# Patient Record
Sex: FEMALE | Race: WHITE | URBAN - METROPOLITAN AREA
[De-identification: names, ages, dates, MRNs, and addresses within clinical notes are randomized per-mention and may not be internally consistent; named-entity substitution may affect disease eponyms.]

---

## 2017-06-06 ENCOUNTER — TRANSFERRED RECORDS (OUTPATIENT)
Dept: HEALTH INFORMATION MANAGEMENT | Facility: CLINIC | Age: 57
End: 2017-06-06

## 2017-07-17 ENCOUNTER — TRANSFERRED RECORDS (OUTPATIENT)
Dept: HEALTH INFORMATION MANAGEMENT | Facility: CLINIC | Age: 57
End: 2017-07-17

## 2017-11-14 ENCOUNTER — TRANSFERRED RECORDS (OUTPATIENT)
Dept: HEALTH INFORMATION MANAGEMENT | Facility: CLINIC | Age: 57
End: 2017-11-14

## 2018-05-02 ENCOUNTER — TRANSFERRED RECORDS (OUTPATIENT)
Dept: HEALTH INFORMATION MANAGEMENT | Facility: CLINIC | Age: 58
End: 2018-05-02

## 2019-03-20 ENCOUNTER — TELEPHONE (OUTPATIENT)
Dept: OTOLARYNGOLOGY | Facility: CLINIC | Age: 59
End: 2019-03-20

## 2019-03-20 ENCOUNTER — DOCUMENTATION ONLY (OUTPATIENT)
Dept: CARE COORDINATION | Facility: CLINIC | Age: 59
End: 2019-03-20

## 2019-03-20 NOTE — TELEPHONE ENCOUNTER
Right Fax referral - Lisa Mondragon PA-C referring to Dr. Mujica for Dysphagia. Per notes, Pt will call to schedule.

## 2019-03-22 ENCOUNTER — HEALTH MAINTENANCE LETTER (OUTPATIENT)
Age: 59
End: 2019-03-22

## 2019-03-25 NOTE — TELEPHONE ENCOUNTER
FUTURE VISIT INFORMATION      FUTURE VISIT INFORMATION:    Date: 4/12/19    Time: 9:00 am    Location: [facility] ENT  REFERRAL INFORMATION:    Referring provider:  HI Person    Referring providers clinic:  [facility]    Reason for visit/diagnosis  Dysphagia    RECORDS REQUESTED FROM:       Clinic name Comments Records Status Imaging Status   [facility] Office Visit/Referral 3/6/19, 6/7/18 HI Person Epic    [facility] Speech Office Visit 5/2/18  Care Everywhere    [facility] Xray XR Video Swallow-5/2/18 Care Everywhere PACS   [facility] Imaging XR Swallow Study-6/6/17,   XR Esophagus-8/23/16 Care Everywhere PACS                 3/25/19-Sent request for imaging to [facility] at [phone] and [facility] at [phone].  PB  3/26/19-Received images from [facility].  Still awaiting [facility].  PB  3/29/19-Received Fax from [facility] but no images.  Called and had them pushed.  PB  3/29/19-Images pushed and archived from [facility].  PB

## 2019-04-12 ENCOUNTER — PRE VISIT (OUTPATIENT)
Dept: OTOLARYNGOLOGY | Facility: CLINIC | Age: 59
End: 2019-04-12

## 2019-04-12 ENCOUNTER — OFFICE VISIT (OUTPATIENT)
Dept: OTOLARYNGOLOGY | Facility: CLINIC | Age: 59
End: 2019-04-12
Payer: COMMERCIAL

## 2019-04-12 VITALS — BODY MASS INDEX: 30.32 KG/M2 | WEIGHT: 182 LBS | HEIGHT: 65 IN

## 2019-04-12 DIAGNOSIS — R13.10 DYSPHAGIA, UNSPECIFIED TYPE: Primary | ICD-10-CM

## 2019-04-12 DIAGNOSIS — R13.10 DYSPHAGIA, UNSPECIFIED TYPE: ICD-10-CM

## 2019-04-12 LAB
BUN SERPL-MCNC: 12 MG/DL (ref 7–30)
CREAT SERPL-MCNC: 0.99 MG/DL (ref 0.52–1.04)
GFR SERPL CREATININE-BSD FRML MDRD: 63 ML/MIN/{1.73_M2}

## 2019-04-12 RX ORDER — ALPRAZOLAM 0.5 MG
0.5 TABLET ORAL
COMMUNITY
Start: 2019-04-02

## 2019-04-12 RX ORDER — PREGABALIN 50 MG/1
50 CAPSULE ORAL
COMMUNITY
Start: 2018-09-18

## 2019-04-12 RX ORDER — ESTRADIOL 0.07 MG/D
1 FILM, EXTENDED RELEASE TRANSDERMAL
COMMUNITY
Start: 2018-04-05

## 2019-04-12 RX ORDER — ALBUTEROL SULFATE 90 UG/1
AEROSOL, METERED RESPIRATORY (INHALATION)
Refills: 3 | COMMUNITY
Start: 2019-04-10

## 2019-04-12 RX ORDER — NAPROXEN SODIUM 220 MG
220 TABLET ORAL
COMMUNITY

## 2019-04-12 RX ORDER — FLUTICASONE PROPIONATE 50 MCG
1 SPRAY, SUSPENSION (ML) NASAL
COMMUNITY
Start: 2018-05-21

## 2019-04-12 RX ORDER — OMEPRAZOLE 40 MG/1
1 CAPSULE, DELAYED RELEASE ORAL
COMMUNITY
Start: 2016-03-08

## 2019-04-12 ASSESSMENT — PAIN SCALES - GENERAL: PAINLEVEL: NO PAIN (0)

## 2019-04-12 ASSESSMENT — MIFFLIN-ST. JEOR: SCORE: 1404.55

## 2019-04-12 NOTE — NURSING NOTE
"Chief Complaint   Patient presents with     Consult     Dysphagia . Unable to take Bp . Machine malfunction     Height 1.64 m (5' 4.57\"), weight 82.6 kg (182 lb).    Eric Whitman LPN    "

## 2019-04-12 NOTE — PATIENT INSTRUCTIONS
1.  You were seen in the ENT Clinic today by Dr. Medina.  If you have any questions or concerns after your appointment, please call [phone]. Press option #1 for scheduling related needs. Press option #3 for Nurse advice.    2.  Please schedule an appointment for the following:   - MRI Scan - Head   - Labs - BUN/Creatinine   - Neurology - Rule Out Neurological Dysfunction Causing Difficulty Swallowing   - Speech Therapy - Video Swallow Study    3.  Plan is to return to clinic to see Dr. Mujica or Dr. Barba for further evaluation of dysphagia.      Julissa Curiel LPN  [facility] Otolaryng[facility]  [phone]    The patient presents with a history of a globus sensation and mild dysphagia.  The patient will be referred for a speech/swallowing pathology evaluation and a consultation with Dr. Alfredo Mills or Dr. Tamara Mujica. The patient will also be referred to neurology with a MRI scan of the head prior to this visit to evaluate for neurological dysfunction that might be causing the difficulty initiating swallowing.

## 2019-04-12 NOTE — LETTER
4/12/2019       RE: Shalini Natarajan  [address] [place] [address]     Dear Colleague,    Thank you for referring your patient, Shalini Natarajan, to the [facility] EAR NOSE AND THROAT at [facility]. Please see a copy of my visit note below.    The patient presents with a history of a globus sensation and severe dysphagia.  She has been evaluated by [facility] and diagnosed with a Schotzki's Ring and gastroesophageal reflux. The patient denies odynophagia, hemoptysis, hematemasis, but she has experienced weight loss due to decreased food intake.  The patient reports that the symptoms have been progressively worsening over the past few months.  The patient denies sinusitis, rhinitis, facial pain, nasal obstruction or purulent nasal discharge. The patient denies chronic or recurrent tonsillitis, chronic or recurrent pharyngitis. The patient denies otalgia, otorrhea, eustachian tube dysfunction, ear infections, dizziness or tinnitus.     This patient is seen in consultation at the request of Dr. Lisa Mondragon.    All other systems were reviewed and they are either negative or they are not directly pertinent to this Otolaryngology examination.    Past Medical History:    No past medical history on file.    Past Surgical History:    No past surgical history on file.    Medications:      Current Outpatient Medications:      ALPRAZolam (XANAX) 0.5 MG tablet, Take 0.5 mg by mouth, Disp: , Rfl:      cetirizine HCl (ZYRTEC ALLERGY) 10 MG CAPS, , Disp: , Rfl:      estradiol (VIVELLE-DOT) 0.075 MG/24HR BIW patch, Place 1 patch onto the skin, Disp: , Rfl:      fluticasone (FLONASE) 50 MCG/ACT nasal spray, 1 spray, Disp: , Rfl:      melatonin 5 MG CAPS, Take 5 mg by mouth, Disp: , Rfl:      naproxen sodium (ALEVE) 220 MG tablet, Take 220 mg by mouth, Disp: , Rfl:      omeprazole (PRILOSEC) 40 MG DR capsule, Take 1 capsule by mouth, Disp: , Rfl:      pregabalin (LYRICA) 50 MG  capsule, Take 50 mg by mouth, Disp: , Rfl:      PROAIR  (90 Base) MCG/ACT inhaler, , Disp: , Rfl: 3    Allergies:    Dogs; Grass; Mold; and Trees    Physical Examination:    The patient is a well developed, well nourished female in no apparent distress.  She is normocephalic, atraumatic with pupils equally round and reactive to light.    Oral Cavity Examination:  Normal mucosa with no masses or lesions  Nasal Examination: Normal mucosa with no masses or lesions  Ear Examination: Ear canals clear, tympanic membranes and middle ear spaces normal  Neurological Examination: Facial nerve function intact and symmetric  Integumentary Examination: No lesions on the skin of the head and neck  Neck Examination: No masses or lesions, no lymphadenopathy  Endocrine Examination: Normal thyroid examination  Flexible Fiberoptic Laryngoscopy: Normal nasopharynx, base of tongue, valleculae, pyriform sinuses, false vocal cords and true vocal cords.  The vocal cords are moving normally and there are no lesions or masses in the larynx.    Assessment and Plan:    The patient presents with a history of a globus sensation and mild dysphagia.  The patient will be referred for a speech/swallowing pathology evaluation and a consultation with Dr. Alfredo Mills or Dr. Tamara Mujica. The patient will also be referred to neurology with a MRI scan of the head prior to this visit to evaluate for neurological dysfunction that might be causing the difficulty initiating swallowing.     PreOp Diagnosis:  Dysphagia    PostOp Diagnosis: Dysphagia    Procedure: Flexible Fiberoptic Laryngoscopy    Estimated Blood Loss: None    Complications: None    Consent: The patient was informed of the possible complications and probable outcomes of the procedure and the patient gave informed consent.    Fluids: None    Drains: None    Disposition: to home    Findings: Normal nasopharynx, base of tongue, pyriform sinuses, epiglottis, valleculae, false vocal  cords, true vocal cords, and larynx.  Normal motion of the vocal cords with no lesions, masses, nodules, or polyps bilaterally.     Description of Procedure:    The patient was positioned on the clinic room chair. The nose was decongested and anesthetized with 2 puffs in each nostrils lidocaine hcl 4% and oxymetazoline hcl 0.05% topical solution. The flexible fiberoptic scope was passed into the each nostrils and the nasal passages visualized. The scope was passed through the left nostril into the nasopharynx which was normal. The based of tongue and tonsil tissues were visualized and found to be normal. The vocal cords and larynx were visualized and the true vocal cords were visualized and found to be normal.       CC: Lisa Mondragon      Again, thank you for allowing me to participate in the care of your patient.      Sincerely,    Michele Medina MD

## 2019-04-12 NOTE — PROGRESS NOTES
The patient presents with a history of a globus sensation and severe dysphagia.  She has been evaluated by [facility] and diagnosed with a Schotzki's Ring and gastroesophageal reflux. The patient denies odynophagia, hemoptysis, hematemasis, but she has experienced weight loss due to decreased food intake.  The patient reports that the symptoms have been progressively worsening over the past few months.  The patient denies sinusitis, rhinitis, facial pain, nasal obstruction or purulent nasal discharge. The patient denies chronic or recurrent tonsillitis, chronic or recurrent pharyngitis. The patient denies otalgia, otorrhea, eustachian tube dysfunction, ear infections, dizziness or tinnitus.     This patient is seen in consultation at the request of Dr. Lisa Mondragon.    All other systems were reviewed and they are either negative or they are not directly pertinent to this Otolaryngology examination.    Past Medical History:    No past medical history on file.    Past Surgical History:    No past surgical history on file.    Medications:      Current Outpatient Medications:      ALPRAZolam (XANAX) 0.5 MG tablet, Take 0.5 mg by mouth, Disp: , Rfl:      cetirizine HCl (ZYRTEC ALLERGY) 10 MG CAPS, , Disp: , Rfl:      estradiol (VIVELLE-DOT) 0.075 MG/24HR BIW patch, Place 1 patch onto the skin, Disp: , Rfl:      fluticasone (FLONASE) 50 MCG/ACT nasal spray, 1 spray, Disp: , Rfl:      melatonin 5 MG CAPS, Take 5 mg by mouth, Disp: , Rfl:      naproxen sodium (ALEVE) 220 MG tablet, Take 220 mg by mouth, Disp: , Rfl:      omeprazole (PRILOSEC) 40 MG DR capsule, Take 1 capsule by mouth, Disp: , Rfl:      pregabalin (LYRICA) 50 MG capsule, Take 50 mg by mouth, Disp: , Rfl:      PROAIR  (90 Base) MCG/ACT inhaler, , Disp: , Rfl: 3    Allergies:    Dogs; Grass; Mold; and Trees    Physical Examination:    The patient is a well developed, well nourished female in no apparent distress.  She is normocephalic,  atraumatic with pupils equally round and reactive to light.    Oral Cavity Examination:  Normal mucosa with no masses or lesions  Nasal Examination: Normal mucosa with no masses or lesions  Ear Examination: Ear canals clear, tympanic membranes and middle ear spaces normal  Neurological Examination: Facial nerve function intact and symmetric  Integumentary Examination: No lesions on the skin of the head and neck  Neck Examination: No masses or lesions, no lymphadenopathy  Endocrine Examination: Normal thyroid examination  Flexible Fiberoptic Laryngoscopy: Normal nasopharynx, base of tongue, valleculae, pyriform sinuses, false vocal cords and true vocal cords.  The vocal cords are moving normally and there are no lesions or masses in the larynx.    Assessment and Plan:    The patient presents with a history of a globus sensation and mild dysphagia.  The patient will be referred for a speech/swallowing pathology evaluation and a consultation with Dr. Alfredo Mills or Dr. Tamara Mujica. The patient will also be referred to neurology with a MRI scan of the head prior to this visit to evaluate for neurological dysfunction that might be causing the difficulty initiating swallowing.     PreOp Diagnosis:  Dysphagia    PostOp Diagnosis: Dysphagia    Procedure: Flexible Fiberoptic Laryngoscopy    Estimated Blood Loss: None    Complications: None    Consent: The patient was informed of the possible complications and probable outcomes of the procedure and the patient gave informed consent.    Fluids: None    Drains: None    Disposition: to home    Findings: Normal nasopharynx, base of tongue, pyriform sinuses, epiglottis, valleculae, false vocal cords, true vocal cords, and larynx.  Normal motion of the vocal cords with no lesions, masses, nodules, or polyps bilaterally.     Description of Procedure:    The patient was positioned on the clinic room chair. The nose was decongested and anesthetized with 2 puffs in each  nostrils lidocaine hcl 4% and oxymetazoline hcl 0.05% topical solution. The flexible fiberoptic scope was passed into the each nostrils and the nasal passages visualized. The scope was passed through the left nostril into the nasopharynx which was normal. The based of tongue and tonsil tissues were visualized and found to be normal. The vocal cords and larynx were visualized and the true vocal cords were visualized and found to be normal.       CC: Lisa Mondragon

## 2019-04-16 ASSESSMENT — ENCOUNTER SYMPTOMS
ABDOMINAL PAIN: 0
DECREASED APPETITE: 1
WEIGHT LOSS: 1
NECK PAIN: 1
MUSCLE WEAKNESS: 1
DIARRHEA: 0
JAUNDICE: 0
POOR WOUND HEALING: 0
HALLUCINATIONS: 0
NAIL CHANGES: 0
FATIGUE: 1
BACK PAIN: 0
ALTERED TEMPERATURE REGULATION: 1
HEARTBURN: 0
CONSTIPATION: 1
MYALGIAS: 1
JOINT SWELLING: 0
STIFFNESS: 1
BLOATING: 0
VOMITING: 0
NAUSEA: 0
FEVER: 0
BOWEL INCONTINENCE: 0
NIGHT SWEATS: 0
ARTHRALGIAS: 1
BRUISES/BLEEDS EASILY: 1
RECTAL PAIN: 0
INCREASED ENERGY: 1
BLOOD IN STOOL: 0
CHILLS: 1
SWOLLEN GLANDS: 0
MUSCLE CRAMPS: 0
POLYDIPSIA: 1
SKIN CHANGES: 0

## 2019-04-21 ENCOUNTER — ANCILLARY PROCEDURE (OUTPATIENT)
Dept: MRI IMAGING | Facility: CLINIC | Age: 59
End: 2019-04-21
Attending: OTOLARYNGOLOGY
Payer: COMMERCIAL

## 2019-04-21 DIAGNOSIS — R13.10 DYSPHAGIA, UNSPECIFIED TYPE: ICD-10-CM

## 2019-04-21 RX ORDER — GADOBUTROL 604.72 MG/ML
7.5 INJECTION INTRAVENOUS ONCE
Status: COMPLETED | OUTPATIENT
Start: 2019-04-21 | End: 2019-04-21

## 2019-04-21 RX ADMIN — GADOBUTROL 7.5 ML: 604.72 INJECTION INTRAVENOUS at 10:48

## 2019-04-24 PROBLEM — E78.00 PURE HYPERCHOLESTEROLEMIA: Status: ACTIVE | Noted: 2018-06-07

## 2019-04-24 PROBLEM — K21.9 GERD (GASTROESOPHAGEAL REFLUX DISEASE): Status: ACTIVE | Noted: 2018-06-07

## 2019-04-24 PROBLEM — Z79.890 NEED FOR PROPHYLACTIC HORMONE REPLACEMENT THERAPY (POSTMENOPAUSAL): Status: ACTIVE | Noted: 2018-06-07

## 2019-04-24 PROBLEM — Z98.890 S/P LUMPECTOMY, RIGHT BREAST: Status: ACTIVE | Noted: 2019-03-05

## 2019-04-24 PROBLEM — F40.240 CLAUSTROPHOBIA: Status: ACTIVE | Noted: 2018-12-19

## 2019-04-24 PROBLEM — Q39.4 TERMINAL ESOPHAGEAL WEB: Status: ACTIVE | Noted: 2017-12-18

## 2019-04-24 PROBLEM — M79.7 FIBROMYALGIA: Status: ACTIVE | Noted: 2018-06-07

## 2019-04-24 PROBLEM — R13.12 OROPHARYNGEAL DYSPHAGIA: Status: ACTIVE | Noted: 2018-06-07

## 2019-04-24 PROBLEM — N64.89 RADIAL SCAR OF BREAST: Status: ACTIVE | Noted: 2019-02-14

## 2019-04-25 ENCOUNTER — OFFICE VISIT (OUTPATIENT)
Dept: NEUROLOGY | Facility: CLINIC | Age: 59
End: 2019-04-25
Attending: OTOLARYNGOLOGY
Payer: COMMERCIAL

## 2019-04-25 VITALS
OXYGEN SATURATION: 99 % | SYSTOLIC BLOOD PRESSURE: 96 MMHG | HEART RATE: 52 BPM | WEIGHT: 183 LBS | DIASTOLIC BLOOD PRESSURE: 53 MMHG | HEIGHT: 65 IN | BODY MASS INDEX: 30.49 KG/M2

## 2019-04-25 DIAGNOSIS — R13.10 DYSPHAGIA, UNSPECIFIED TYPE: ICD-10-CM

## 2019-04-25 ASSESSMENT — PAIN SCALES - GENERAL: PAINLEVEL: NO PAIN (0)

## 2019-04-25 ASSESSMENT — MIFFLIN-ST. JEOR: SCORE: 1408.02

## 2019-04-25 NOTE — LETTER
2019       RE: Shalini Natarajan  [address]  [place] [address]     Dear Colleague,    Thank you for referring your patient, Shalini Natarajan, to the [facility] NEUROLOGY at [facility]. Please see a copy of my visit note below.      DEPARTMENT OF NEUROLOGY    Patient Name:  Shalini Natarajan  MRN:  [id number]    :  1960  Date of Clinic Visit:  2019  Primary Care Provider:  No primary care provider on file.    CHIEF COMPLAINT: difficulty swallowing    HISTORY OF PRESENT ILLNESS:  Shalini Natarajan is a 59 year old female with history of Schatzki's ring, esophageal web, and chronic dysphagia who presents to general neurology clinic for evaluation of possible neurologic contribution to progression of chronic dysphagia. She reports that she has had difficulty swallowing for a couple of years and multiple procedures to dilate her esophagus, but none in the past year at least. She thinks this has worsened slightly in the past year, as she noticed that she is no longer able to drink coffee while driving, i.e. not concentrating on swallowing. She complains of trouble particularly with liquids, but now she feels that she is choking all the time on both food and water and this has caused her to lose about 25 pounds over a two week period that was particularly bad.    She also has a sensation of fullness or a lump in her throat when she tries to swallow. She was told that she has a bone which is abutting her esophagus and one surgeon wanted to operate to relieve this, and another told her this was not the source of her problems, so she has not had surgery. 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    She denies drooling, facial weakness, diplopia, dysarthria, hypophonia, head droop, weakness or sensory abnormalities of the arms or legs. She does report observing  "muscle twitches under the surface of her skin on her left forearm and her right eyelid. She says she has been told that she mumbles her whole life.    SLP  diagnosed Schatzki's ring, dilations multiple times, found a cyst on thyroid, not an issue. Went to ENT. Laryngoscopy, has GERD, if the bone in posterior aspect on throat, would have more trouble with solids. Used to be able to guzzle, but could not take little sips and swallow, cannot any longer.     Adopted. Having genetic testing done in May for daughter.    ASSESSMENT AND PLAN:  1. Dysphagia- there is some but not entirely convincing evidence for a bulbar neuromuscular process. It is reasonable to obtain an EMG to rule out possible motor neuron disease due to her report of dysphagia significant enough to lose weight, dysarthria on exam, and report of fasciculations. If there is absence of evidence by EMG to support this concern, it seems unlikely that there is a neurologic contribution to her dysphagia. If this is ruled out, and if no improvement is gained by what GI and SLP can offer, may need to consider re-imaging the neck to address C spine concerns.    Plan:  - EMG  - SLP eval for diet recommendations  - GI consult  - RTC when above have been completed    Patient was seen and discussed with Dr. Kaye.    Stacy House MD  PGY-3 Resident  [facility] Department of Neurology  Pager: [phone]    I saw and evaluated Ms. Huerta with Dr. House. I reviewed relevant labs and imaging. I agree with the findings, assessment and plan as reported by Dr. House.    Barbie Renner MD    PHYSICAL EXAMINATION:  Vitals: BP 96/53 (BP Location: Left arm, Patient Position: Sitting, Cuff Size: Adult Large)   Pulse 52   Ht 1.638 m (5' 4.5\")   Wt 83 kg (183 lb)   SpO2 99%   BMI 30.93 kg/m     General: adult female patient, seated on chair, NAD, unaccompanied  HEENT: at/nc, oropharynx clear, mucosa moist. Prominent midline tongue furrow, no " fasciculations. Mucus accumulation dangling from deviated uvula.  Cardiac: RRR, no m/r/g  Pulmonary: CTAB, nonlabored on RA  Abdomen: soft, nontender, nondistended, BS+  Extremities: warm, dry, w/o edema  Skin: no jaundice or rash  Psych: pleasant affect and congruent mood, thought content w/o abnormality, process linear and logical  Neuro: fundi benign   Mental status: alert and oriented to person, place, and time; language is fluent with intact comprehension and naming   Cranial nerves: VFF, PERRL, EOMI, no facial asymmetry, facial sensation intact, tongue midline, uvula is deviated to the R, very subtle dysarthria, but voice is not nasal or hypophonic   Motor: No abnormal posture, tone, atrophy, or movements/fasciculations.    Biceps Triceps Deltoid Hip flexor Knee extension Knee flexion Ankle extension Ankle flexion   Right 5 5 5 5 5 5 5 5 5   Left 5 5 5 5 5 5 5 5 5    Reflexes: Absent Babinski. Absent Diaz.   Brachioradialis Biceps Triceps Patellar Achilles   Right 1+ 2+ 1+ 2+ absent   Left 1+ 2+ 1+ 2+ absent    Sensory: Intact to light touch, pin prick, and vibration in all extremities. Romberg exam within normal limits.   Coordination: Intact FNF and heel-shin. No Dysmetria. No Dysdiadochokinesia.   Gait: Normal width, stride length, turn, and arm swing.    INVESTIGATIONS:   MRI brain 4/21/19  Findings:  There is no mass effect, midline shift, or evidence of intracranial  hemorrhage. The ventricles are proportionate to the cerebral sulci.  Normal major vascular intracranial flow-voids. Scattered T2  hyperintensity in bilateral white matter, not disproportionate to age.     Postcontrast images demonstrate no abnormal intracranial enhancement.     No abnormality of the skull marrow signal. The visualized portions of  paranasal sinuses, and mastoid air cells are relatively clear. The  orbits are grossly unremarkable.                                                               Impression:  No findings to  explain patient's symptoms.    REVIEW OF SYSTEMS: 12-point RoS negative except as per HPI.    ALLERGIES:  Allergies   Allergen Reactions     Dogs      Grass      Mold      Trees      MEDICATIONS:  Current Outpatient Medications   Medication Sig Dispense Refill     ALPRAZolam (XANAX) 0.5 MG tablet Take 0.5 mg by mouth       cetirizine HCl (ZYRTEC ALLERGY) 10 MG CAPS        estradiol (VIVELLE-DOT) 0.075 MG/24HR BIW patch Place 1 patch onto the skin       fluticasone (FLONASE) 50 MCG/ACT nasal spray 1 spray       melatonin 5 MG CAPS Take 5 mg by mouth       naproxen sodium (ALEVE) 220 MG tablet Take 220 mg by mouth       omeprazole (PRILOSEC) 40 MG DR capsule Take 1 capsule by mouth       pregabalin (LYRICA) 50 MG capsule Take 50 mg by mouth       PROAIR  (90 Base) MCG/ACT inhaler   3     PAST MEDICAL HISTORY:  No past medical history on file.  PAST SURGICAL HISTORY:  No past surgical history on file.  SOCIAL HISTORY:  Social History     Socioeconomic History     Marital status:      Spouse name: Not on file     Number of children: Not on file     Years of education: Not on file     Highest education level: Not on file   Occupational History     Not on file   Social Needs     Financial resource strain: Not on file     Food insecurity:     Worry: Not on file     Inability: Not on file     Transportation needs:     Medical: Not on file     Non-medical: Not on file   Tobacco Use     Smoking status: Not on file   Substance and Sexual Activity     Alcohol use: Not on file     Drug use: Not on file     Sexual activity: Not on file   Lifestyle     Physical activity:     Days per week: Not on file     Minutes per session: Not on file     Stress: Not on file   Relationships     Social connections:     Talks on phone: Not on file     Gets together: Not on file     Attends Gnosticism service: Not on file     Active member of club or organization: Not on file     Attends meetings of clubs or organizations: Not on file      Relationship status: Not on file     Intimate partner violence:     Fear of current or ex partner: Not on file     Emotionally abused: Not on file     Physically abused: Not on file     Forced sexual activity: Not on file   Other Topics Concern     Not on file   Social History Narrative     Not on file     FAMILY HISTORY:  No family history on file.    Answers for HPI/ROS submitted by the patient on 4/16/2019     Again, thank you for allowing me to participate in the care of your patient.      Sincerely,    Stacy House MD

## 2019-04-25 NOTE — PROGRESS NOTES
DEPARTMENT OF NEUROLOGY    Patient Name:  Shalini Natarajan  MRN:  [id number]    :  1960  Date of Clinic Visit:  2019  Primary Care Provider:  No primary care provider on file.    CHIEF COMPLAINT: difficulty swallowing    HISTORY OF PRESENT ILLNESS:  Shalini Natarajan is a 59 year old female with history of Schatzki's ring, esophageal web, and chronic dysphagia who presents to general neurology clinic for evaluation of possible neurologic contribution to progression of chronic dysphagia. She reports that she has had difficulty swallowing for a couple of years and multiple procedures to dilate her esophagus, but none in the past year at least. She thinks this has worsened slightly in the past year, as she noticed that she is no longer able to drink coffee while driving, i.e. not concentrating on swallowing. She complains of trouble particularly with liquids, but now she feels that she is choking all the time on both food and water and this has caused her to lose about 25 pounds over a two week period that was particularly bad.    She also has a sensation of fullness or a lump in her throat when she tries to swallow. She was told that she has a bone which is abutting her esophagus and one surgeon wanted to operate to relieve this, and another told her this was not the source of her problems, so she has not had surgery. 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    She denies drooling, facial weakness, diplopia, dysarthria, hypophonia, head droop, weakness or sensory abnormalities of the arms or legs. She does report observing muscle twitches under the surface of her skin on her left forearm and her right eyelid. She says she has been told that she mumbles her whole life.    SLP  diagnosed Schatzki's ring, dilations multiple times, found a cyst on thyroid, not an issue. Went to ENT.  "Laryngoscopy, has GERD, if the bone in posterior aspect on throat, would have more trouble with solids. Used to be able to guzzle, but could not take little sips and swallow, cannot any longer.     Adopted. Having genetic testing done in May for daughter.    ASSESSMENT AND PLAN:  1. Dysphagia- there is some but not entirely convincing evidence for a bulbar neuromuscular process. It is reasonable to obtain an EMG to rule out possible motor neuron disease due to her report of dysphagia significant enough to lose weight, dysarthria on exam, and report of fasciculations. If there is absence of evidence by EMG to support this concern, it seems unlikely that there is a neurologic contribution to her dysphagia. If this is ruled out, and if no improvement is gained by what GI and SLP can offer, may need to consider re-imaging the neck to address C spine concerns.    Plan:  - EMG  - SLP eval for diet recommendations  - GI consult  - RTC when above have been completed    Patient was seen and discussed with Dr. Kaye.    Stacy House MD  PGY-3 Resident  [facility] Department of Neurology  Pager: [phone]    I saw and evaluated Ms. Huerta with Dr. House. I reviewed relevant labs and imaging. I agree with the findings, assessment and plan as reported by Dr. House.  Barbie Renner MD      PHYSICAL EXAMINATION:  Vitals: BP 96/53 (BP Location: Left arm, Patient Position: Sitting, Cuff Size: Adult Large)   Pulse 52   Ht 1.638 m (5' 4.5\")   Wt 83 kg (183 lb)   SpO2 99%   BMI 30.93 kg/m    General: adult female patient, seated on chair, NAD, unaccompanied  HEENT: at/nc, oropharynx clear, mucosa moist. Prominent midline tongue furrow, no fasciculations. Mucus accumulation dangling from deviated uvula.  Cardiac: RRR, no m/r/g  Pulmonary: CTAB, nonlabored on RA  Abdomen: soft, nontender, nondistended, BS+  Extremities: warm, dry, w/o edema  Skin: no jaundice or rash  Psych: pleasant affect and congruent mood, " thought content w/o abnormality, process linear and logical  Neuro: fundi benign   Mental status: alert and oriented to person, place, and time; language is fluent with intact comprehension and naming   Cranial nerves: VFF, PERRL, EOMI, no facial asymmetry, facial sensation intact, tongue midline, uvula is deviated to the R, very subtle dysarthria, but voice is not nasal or hypophonic   Motor: No abnormal posture, tone, atrophy, or movements/fasciculations.    Biceps Triceps Deltoid Hip flexor Knee extension Knee flexion Ankle extension Ankle flexion   Right 5 5 5 5 5 5 5 5 5   Left 5 5 5 5 5 5 5 5 5    Reflexes: Absent Babinski. Absent Diaz.   Brachioradialis Biceps Triceps Patellar Achilles   Right 1+ 2+ 1+ 2+ absent   Left 1+ 2+ 1+ 2+ absent    Sensory: Intact to light touch, pin prick, and vibration in all extremities. Romberg exam within normal limits.   Coordination: Intact FNF and heel-shin. No Dysmetria. No Dysdiadochokinesia.   Gait: Normal width, stride length, turn, and arm swing.    INVESTIGATIONS:   MRI brain 4/21/19  Findings:  There is no mass effect, midline shift, or evidence of intracranial  hemorrhage. The ventricles are proportionate to the cerebral sulci.  Normal major vascular intracranial flow-voids. Scattered T2  hyperintensity in bilateral white matter, not disproportionate to age.     Postcontrast images demonstrate no abnormal intracranial enhancement.     No abnormality of the skull marrow signal. The visualized portions of  paranasal sinuses, and mastoid air cells are relatively clear. The  orbits are grossly unremarkable.                                                                      Impression:  No findings to explain patient's symptoms.    REVIEW OF SYSTEMS: 12-point RoS negative except as per HPI.    ALLERGIES:  Allergies   Allergen Reactions     Dogs      Grass      Mold      Trees      MEDICATIONS:  Current Outpatient Medications   Medication Sig Dispense Refill      ALPRAZolam (XANAX) 0.5 MG tablet Take 0.5 mg by mouth       cetirizine HCl (ZYRTEC ALLERGY) 10 MG CAPS        estradiol (VIVELLE-DOT) 0.075 MG/24HR BIW patch Place 1 patch onto the skin       fluticasone (FLONASE) 50 MCG/ACT nasal spray 1 spray       melatonin 5 MG CAPS Take 5 mg by mouth       naproxen sodium (ALEVE) 220 MG tablet Take 220 mg by mouth       omeprazole (PRILOSEC) 40 MG DR capsule Take 1 capsule by mouth       pregabalin (LYRICA) 50 MG capsule Take 50 mg by mouth       PROAIR  (90 Base) MCG/ACT inhaler   3     PAST MEDICAL HISTORY:  No past medical history on file.  PAST SURGICAL HISTORY:  No past surgical history on file.  SOCIAL HISTORY:  Social History     Socioeconomic History     Marital status:      Spouse name: Not on file     Number of children: Not on file     Years of education: Not on file     Highest education level: Not on file   Occupational History     Not on file   Social Needs     Financial resource strain: Not on file     Food insecurity:     Worry: Not on file     Inability: Not on file     Transportation needs:     Medical: Not on file     Non-medical: Not on file   Tobacco Use     Smoking status: Not on file   Substance and Sexual Activity     Alcohol use: Not on file     Drug use: Not on file     Sexual activity: Not on file   Lifestyle     Physical activity:     Days per week: Not on file     Minutes per session: Not on file     Stress: Not on file   Relationships     Social connections:     Talks on phone: Not on file     Gets together: Not on file     Attends Jainism service: Not on file     Active member of club or organization: Not on file     Attends meetings of clubs or organizations: Not on file     Relationship status: Not on file     Intimate partner violence:     Fear of current or ex partner: Not on file     Emotionally abused: Not on file     Physically abused: Not on file     Forced sexual activity: Not on file   Other Topics Concern     Not on file    Social History Narrative     Not on file     FAMILY HISTORY:  No family history on file.    Answers for HPI/ROS submitted by the patient on 4/16/2019   General Symptoms: Yes  Skin Symptoms: Yes  HENT Symptoms: No  EYE SYMPTOMS: No  HEART SYMPTOMS: No  LUNG SYMPTOMS: No  INTESTINAL SYMPTOMS: Yes  URINARY SYMPTOMS: No  GYNECOLOGIC SYMPTOMS: No  BREAST SYMPTOMS: No  SKELETAL SYMPTOMS: Yes  BLOOD SYMPTOMS: Yes  NERVOUS SYSTEM SYMPTOMS: No  MENTAL HEALTH SYMPTOMS: No  Fever: No  Loss of appetite: Yes  Weight loss: Yes  Fatigue: Yes  Night sweats: No  Chills: Yes  Increased stress: No  Excessive thirst: Yes  Feeling hot or cold when others believe the temperature is normal: Yes  Loss of height: No  Post-operative complications: No  Surgical site pain: No  Hallucinations: No  Change in or Loss of Energy: Yes  Hyperactivity: No  Confusion: No  Changes in hair: No  Changes in moles/birth marks: No  Itching: No  Rashes: No  Changes in nails: No  Acne: No  Hair in places you don't want it: No  Change in facial hair: No  Warts: No  Non-healing sores: No  Scarring: No  Flaking of skin: No  Color changes of hands/feet in cold : No  Sun sensitivity: No  Skin thickening: No  Heart burn or indigestion: No  Nausea: No  Vomiting: No  Abdominal pain: No  Bloating: No  Constipation: Yes  Diarrhea: No  Blood in stool: No  Black stools: No  Rectal or Anal pain: No  Fecal incontinence: No  Yellowing of skin or eyes: No  Vomit with blood: No  Change in stools: No  Back pain: No  Muscle aches: Yes  Neck pain: Yes  Swollen joints: No  Joint pain: Yes  Bone pain: No  Muscle cramps: No  Muscle weakness: Yes  Joint stiffness: Yes  Bone fracture: No  Anemia: No  Swollen glands: No  Easy bleeding or bruising: Yes  Edema or swelling: No

## 2019-04-25 NOTE — PATIENT INSTRUCTIONS
Schedule Speech therapy evaluation, keep your appointment for a swallow study.    Schedule EMG.    Schedule GI consultation.    Follow up with general neurology when the above have been completed.

## 2019-05-07 ENCOUNTER — THERAPY VISIT (OUTPATIENT)
Dept: SPEECH THERAPY | Facility: CLINIC | Age: 59
End: 2019-05-07
Payer: COMMERCIAL

## 2019-05-07 ENCOUNTER — ANCILLARY PROCEDURE (OUTPATIENT)
Dept: GENERAL RADIOLOGY | Facility: CLINIC | Age: 59
End: 2019-05-07
Attending: OTOLARYNGOLOGY
Payer: COMMERCIAL

## 2019-05-07 DIAGNOSIS — R13.10 DYSPHAGIA, UNSPECIFIED TYPE: ICD-10-CM

## 2019-05-07 RX ORDER — BARIUM SULFATE 400 MG/ML
SUSPENSION ORAL ONCE
Status: COMPLETED | OUTPATIENT
Start: 2019-05-07 | End: 2019-05-07

## 2019-05-07 RX ORDER — BARIUM SULFATE 400 MG/ML
SUSPENSION ORAL ONCE
Status: ACTIVE | OUTPATIENT
Start: 2019-05-07

## 2019-05-07 RX ADMIN — BARIUM SULFATE: 400 SUSPENSION ORAL at 10:18

## 2019-05-07 NOTE — TELEPHONE ENCOUNTER
FUTURE VISIT INFORMATION      FUTURE VISIT INFORMATION:    Date: 7/1/19    Time: 12:50pm    Location: [facility]  REFERRAL INFORMATION:    Referring provider:  Dr. Michele Medina    Referring providers clinic:  [facility]    Reason for visit/diagnosis:  Dysphagia    RECORDS REQUESTED FROM:       Clinic name Comments Records Status Imaging Status   [facility] 4/12/19 - Office Visit / Laryngoscopy - Dr. Michele Medina [facility] 4/25/19 - Office Visit - Dr. Stacy House [facility] Speech Therapy 5/7/19 - Speech Therapy [facility] 5/7/19 - XR Video Swallow w/ Esophagram [facility] 4/22/10, 4/14/15, 8/12/16 , 11/14/17 upper Gi Endoscopy   3/14/16, 7/26/16, 5/31/17 notes with Julissa DO   7/17/17 notes with Dr Nico Ortega    In [facility] [place] - [facility] Office Visit/Referral 3/6/19, 6/7/18 - [facility] Person [facility] Speech 5/2/18 - OP Visit - WHITNEY Morocho Care Everywhere    [facility] Xray XR Video Swallow - 5/2/18 Care Everywhere PACS   [facility] XR Swallow Study - 6/6/17,   XR Esophagus - 8/23/16 Care Everywhere PACS           5/7/19 1:57pm - Faxed request for recs to [facility].   6/24/19 4:47PM sent a 2nd fax to [facility] for records - [place]   6/26/19 5:14PM received [facility] recs, dropped off in [facility] - amay 7.5.19 9:13 AM forwarded email from [facility] recs to HIM to be uploaded into pt's chart. JERAD

## 2019-05-08 NOTE — PROGRESS NOTES
05/07/19 1000   General Information   Type Of Visit Initial   Start Of Care Date 05/07/19   Referring Physician Michele Medina MD    Orders Evaluate And Treat   Orders Comment Dysphagia    Onset Of Illness/injury Or Date Of Surgery   (~2 years ago )   Precautions/limitations No Known Precautions/limitations   Hearing WFL for conversational speech production.    Pertinent History of Current Problem/OT: Additional Occupational Profile Info Pt is a 58 y/o female with a history of Schatzki's ring, esophgeal web, reflux, and chronic dysphagia.  Pt currently undergoing a neurological work up to identify an underlying cause which has been negative up to this point.  Pt was referred by ENT service and has plans to be seen by Dr. Mujica and voice ST.  Pt and wife notably frustrated with swallow deficits.  Pt and partner report ongoing difficulty for ~ 2 years but increased difficulty since December 2018.  Pt and partner report difficulty as sensation of items remaining in throat down to the sternal notch.  Pt reports variable difficulty across textures including liquids with improvement in swallowing occur when she is distracted, standing, or using a straw.  Pt reports further benefit from tucking her chin.  In addition, pt reports improved function when taking xanax prior to intake.  Pt reports to choke on items and reported her last choking episode occurred ~2 weeks ago.  Pt denies coughing with intake.  Pt further reports speech as intermittently sluggish and describes thick tongue.     Respiratory Status Room air   Prior Level Of Function Swallowing   Prior Level Of Function Comment Regular texures and thin liquids    Living Environment [facility]   General Observations Pt alert and cooperative; pt reported that she did not take Xanax this date.     Patient/family Goals Pt would like to be able to swallow with ease.    Clinical Swallow Evaluation   Oral Musculature other (see comments)  (Slight flat nasolabial  fold on L )   Structural Abnormalities none present   Dentition present and adequate   Mucosal Quality adequate   Mandibular Strength and Mobility intact   Oral Labial Strength and Mobility WFL   Lingual Strength and Mobility WFL   Velar Elevation other (see comments)   Buccal Strength and Mobility   (Asymmetrical; soft palate flat on L )   Laryngeal Function Cough;Throat clear;Swallow;Voicing initiated;Dry swallow palpated   VFSS Evaluation   VFSS Additional Documentation Yes   VFSS Eval: Radiology   Radiologist [facility] Radiologist    Views Taken left lateral;A/P   Physical Location of Procedure [facility] Radiology #2   VFSS Eval: Thin Liquid Texture Trial   Mode of Presentation, Thin Liquid straw;cup;self-fed   Order of Presentation 1, 2, 3, 8, 9, 11, 13, 14, 15   Preparatory Phase Holding   Oral Phase, Thin Liquid Premature pharyngeal entry;Residue in oral cavity;Poor AP movement   Pharyngeal Phase, Thin Liquid Delayed swallow reflex   Rosenbek's Penetration Aspiration Scale: Thin Liquid Trial Results 1 - no aspiration, contrast does not enter airway   Successful Strategies Trialed During Procedure, Thin Liquid chin tuck;head turn to the left;hard swallow   Diagnostic Statement Bolus fractination observed with spillage down to the pyriforms prior to pharyngeal swallow response.  Pt benefitted from cue to swallow 'hard and fast' with reduced spillage and no bolus fractination observed.  In addition, chin tuck strategy reduced spillage to the pyriforms.  When in AP view, spillage observed to primarily occurs on the R side.  When implementing a head turn to L, reduced spillage and increased ease of swallow observed.  With head turn to the R, increased spillage and report of difficulty observed.  No evidence of aspiration or penetration.    VFSS Eval: Nectar Thick Liquid Texture Trial   Mode of Presentation, Nectar cup;self-fed   Order of Presentation 4, 5   Preparatory Phase WFL   Oral Phase, [place]  Premature pharyngeal entry   Pharyngeal Phase, Nectar Delayed swallow reflex   Rosenbek's Penetration Aspiration Scale: Nectar-Thick Liquid Trial Results 1 - no aspiration, contrast does not enter airway   Diagnostic Statement Bolus fractination observed.  Swallow delayed with spillage to the pyriforms.  No evidence of aspiration or penetration observed.  Mildly prominent CP bar noted; however, bolus passed freely.     VFSS Eval: Puree Solid Texture Trial   Mode of Presentation, Puree spoon;self-fed   Order of Presentation 6   Preparatory Phase WFL   Oral Phase, Puree Premature pharyngeal entry   Pharyngeal Phase, Puree Delayed swallow reflex   Rosenbek's Penetration Aspiration Scale: Puree Food Trial Results 1 - no aspiration, contrast does not enter airway   Diagnostic Statement Bolus fractination observed with swallow triggered at the level of the valleculae. No evidence of aspiration or penetration.     VFSS Eval: Solid Food Texture Trial   Mode of Presentation, Solid self-fed   Order of Presentation 7, 10, 12   (#12- Barium tablet )   Preparatory Phase WFL   Oral Phase, Solid Premature pharyngeal entry   Pharyngeal Phase, Solid Delayed swallow reflex   Rosenbek's Penetration Aspiration Scale: Solid Food Trial Results 1 - no aspiration, contrast does not enter airway   Successful Strategies Trialed During Procedure, Solid hard swallow   Diagnostic Statement Swallow triggered at the level of the valleculae with no residue remaining after the pharyngeal swallow response.  No evidence of aspiration or penetration.    Swallow Compensations   Swallow Compensations Reduce amounts;Pacing;Chin tuck;Head turn right  (Swallow 'hard and fast' )   Educational Assessment   Barriers to Learning No barriers   Esophageal Phase of Swallow   Esophageal sweep performed during today s vidofluoroscopic exam  No   Esophageal comments Pt with history of reflux, esophageal web, and Schatzki's ring.     General Therapy Interventions    Planned Therapy Interventions Dysphagia Treatment   Dysphagia treatment Instruction of safe swallow strategies;Compensatory strategies for swallowing   Swallow Eval: Clinical Impressions   Skilled Criteria for Therapy Intervention Skilled criteria met.  Treatment indicated.   Dysphagia Outcome Severity Scale (FARIBA) Level 5 - FARIBA   Treatment Diagnosis Mild  oropharyngeal dysphagia    Diet texture recommendations Regular diet;Thin liquids   Recommended Feeding/Eating Techniques alternate between small bites and sips of food/liquid;hard swallow w/ each bite or sip;small sips/bites;turn head left during every swallow;other (see comments);tuck chin during every swallow  ('Swallow hard and fast')   Rehab Potential good, to achieve stated therapy goals   Therapy Frequency other (see comments)  (1x following VFSS for education )   Anticipated Discharge Disposition home w/ outpatient services   Risks and Benefits of Treatment have been explained. Yes   Patient, family and/or staff in agreement with Plan of Care Yes   Clinical Impression Comments Pt seen for a video swallow study per MD orders.  Oral mechanism exam is significant for slight weakness on L side as well as reduced tone of the soft palate on the L side.  Oral skills significant for reduced bolus formation/control and bolus fractionation; pt was unable to move the barium tablet posteriorly in order to elicit the pharyngeal swallow response.  Fractionation occurred with thin liquids, nectar thick liquids, and pureed textures.  Pharyngeal swallow response delayed to the level of the pyriforms upon trials of thin and nectar consistencies.  Swallow triggered at the valleculae with purees and solid textures.  No pharyngeal residue remained after pharyngeal swallow response with adequate epiglottic inversion observed.  There was no evidence of aspiration or penetration across textures.  A CP bar was observed but it did not appear to have a functional impact on bolus  movement.  Pt benefitted from cue to swallow 'hard and fast' as pt exhibited reduced bolus fractionation and reduced spillage.  In addition, when turned in AP view, pt noted to be asymmetrical with preference in sending the bolus to the R side.  Pt also exhibited reduced spillage and reported increased ease of swallow when eliciting a head turn to the L with worsening function and report of increase difficulty when eliciting a head turn to the R.  It should be noted, that while atypical dysfunction is noted, a contributing factor appears to be pt fear and anxiety related to swallowing.  Pt is at increased risk for aspiration; however, etiology of dysfunction at this time is unknown.  Recommend continue regular texture diet and thin liquids with pt self selecting softer preferred items.  Pt would further benefit from taking small bites/sips at a slow pace and implementing chin tuck/head turn to the L and swallowing 'hard and fast.'  Given the anatomical asymmetry, atypical presentation and c/o intermittent sluggish speech, would recommend consult with laryngologist as well as further neurological work up.  Pt seen for treatment this date to educate and instruct on aspiration precautions/strategies.  Further intervention to be determined based on information obtained from above noted consults.     Swallow Goals   SLP Swallow Goals 1   Swallow Goal 1   Goal Identifier 1   Goal Description Pt and caregivers will demonstrate understanding and implementation of information provided including aspiration precautions and safe swallow strategies as determined by the SLP.    Target Date 05/07/19   Date Met 05/07/19   Total Session Time   SLP Eval: VideoFluoroscopic Swallow function Minutes (17828) 25   Total Evaluation Time 25     Thank you for the referral of Shalini Natarajan.  If you have any questions about this report, please contact me using the information below.       Pippa Dodd M.S. CCC-SLP  Speech Language  Pathologist   [facility] [place] [facility] [place] [facility]   Department of Otoolaryngology, D&T- 4th Floor / [address]  Phone: [phone]  Pager: [phone]  Email: [email or web address]

## 2019-05-13 ENCOUNTER — OFFICE VISIT (OUTPATIENT)
Dept: NEUROLOGY | Facility: CLINIC | Age: 59
End: 2019-05-13
Attending: PSYCHIATRY & NEUROLOGY
Payer: COMMERCIAL

## 2019-05-13 DIAGNOSIS — R13.10 DYSPHAGIA, UNSPECIFIED TYPE: ICD-10-CM

## 2019-05-13 DIAGNOSIS — M62.81 MUSCLE WEAKNESS (GENERALIZED): Primary | ICD-10-CM

## 2019-05-13 NOTE — LETTER
5/13/2019       RE: Shalini Natarajan  [address]  [place] [address]     Dear Colleague,    Thank you for referring your patient, Shalini Natarajan, to the [facility] EMG at [facility]. Please see a copy of my visit note below.        [facility]  Electrodiagnostic Laboratory    Nerve Conduction & EMG Report          Patient:       Shalini Natarajan  Patient ID:    [id number]  Gender:        Female  YOB: 1960  Age:           59 Years 3 Months      History & Examination:  Referred by Dr. House for EMG to evaluate dysphagia.    Shalini is a 59-year-old patient with dysphagia.  Swallow study shows problems more with right-sided muscle weakness compared to left.  She does endorse some fatigability.  She does not endorse symptoms elsewhere in her body such as fatigable weakness in the arms or legs.  No fasciculations are seen on examination.  The patient's voice sounds slightly dysarthric this morning but she reports this is her baseline.    Techniques:  Sensory and Motor conduction studies were done with surface recording electrodes. EMG was done with a concentric needle electrode.     Results:  Sensory nerve conduction studies:  Left upper extremities examined rather than right due to the patient's past history of right arm surgeries.  Left median and ulnar antidromic sensory studies are within normal limits.  Left radial study is within normal limits.  Palmar studies are within normal limits.    Motor nerve conduction studies:  Left median and ulnar motor nerve conduction studies are within normal limits.    Repetitive stimulation of the right abductor digit he minimi and right nasalis are within normal limits.    Needle examination in the facial muscles reveals increased amplitude and duration unit potentials with mild reduction when comparing the right side of the tongue to the left side of the tongue.  There were also mild neurogenic changes seen in the left first  dorsal interosseous muscle.  No fasciculations were seen throughout the examination including in the thoracic paraspinal region.    Interpretation:  The EMG shows mild abnormalities of a neurogenic appearance and tongue musculature on the right compared to the left is could represent a localized injury to cranial nerve XII on the right side. Clinical correlation is strongly recommended as to whether this is a significant finding.  It is chronic and inactive in appearance and does not appear to be consistent with a diffuse motor neuron disease.  No evidence of neuromuscular junction disorder either.      EMG Physician:    Michelle Newton MD FAAN        Sensory NCS      Nerve / Sites Rec. Site Onset Peak Ref. NP Amp Ref. PP Amp Dist Jun Ref. Temp     ms ms ms  V  V  V cm m/s m/s  C   L MEDIAN - Dig II Anti      Wrist Dig II 2.71 3.75  24.2 10.0 38.8 14 51.7 48.0 31.6   L ULNAR - Dig V Anti      Wrist Dig V 2.50 3.28  24.6 8.0 45.0 12.5 50.0 48.0 31.7   L RADIAL - Snuff      Forearm Snuff 1.98 2.55  35.1 15.0 45.8 12.5 63.2 48.0 32   L MEDIAN - Ulnar - Palmar      Median Wrist 1.67 2.24 2.40 60.3  53.3 8 48.0  31.9      Ulnar Wrist 1.51 2.03 2.40 36.7  36.0 8 53.0  31.7       Motor NCS      Nerve / Sites Rec. Site Lat Ref. Amp Ref. Rel Amp Dist Jun Ref. Dur. Area Temp.     ms ms mV mV % cm m/s m/s ms %  C   L MEDIAN - APB      Wrist APB 3.80 4.40 7.2 5.0 100 8   8.23 100 32.4      Elbow APB 7.81  6.8  94.2 20 49.9 48.0 8.23 94.4 32.4   L ULNAR - ADM      Wrist ADM 2.29 3.50 9.8 5.0 100 8   7.86 100 32      B.Elbow ADM 5.99  9.6  98 18 48.7 48.0 8.02 96.7 32      A.Elbow ADM 7.55  9.3  95.4 8 51.2 48.0 7.97 90.2 32       Rep Nerve Stim 6      Muscle / Train Time Rate Amp 4-1 Fac Ampl Area 4-1 Fac Area     pps mV % % mVms % %   R ABD DIG MIN (UL)   Baseline 0:00:00 3 9.3 2.3 100 33.0 1.4 100   Post Exercise : 1 0:01:14 3 10.3 1.8 110 35.8 4.6 109   2 0:02:22 3 10.0 -0.5 107 34.6 4.5 105   3 0:03:49 3 9.9 -1.7 106 31.8 9.2  96.5   R NASALIS   Baseline 0:00:00 3 1.4 6.1 100 5.6 3 100   Post Exercise 1 0:01:29 3 1.5 5.4 111 5.3 -0.3 93.3   2 0:02:39 3 1.5 5 113 5.9 -0.3 104   3 0:03:50 3 1.6 4.3 115 6.0 1.2 107       EMG Summary Table     Spontaneous MUAP Recruitment    IA Fib/PSW Fasc H.F. Amp Dur. PPP Pattern   L. FIRST D INTEROSS N None None None 1+ 1+ N N   L. PRON TERES N None None None N N N N   L. EXT INDICIS N None None None N N N N   L. BICEPS N None None None N N N N   L. TRICEPS N None None None N N N N   L. GENIOGLOSSUS N None None None N N N N   R. GENIOGLOSSUS N None None None 1+ 1+ N Mildly Reduced   R. MASSETER N None None None N N N N   L. MASSETER N None None None N N N N   R. BICEPS N None None None N N N N   R. TRICEPS N None None None N N N N   R. DELTOID N None None None N N N N   R. THOR PSP (M) N None None None N N N N   R. THOR PSP (L) N None None None N N N N   R. THOR PSP (U) N None None None N N N N                            Again, thank you for allowing me to participate in the care of your patient.      Sincerely,    Michelle Newton MD

## 2019-05-13 NOTE — PROGRESS NOTES
[facility]  Electrodiagnostic Laboratory    Nerve Conduction & EMG Report          Patient:       Shalini Natarajan  Patient ID:    [id number]  Gender:        Female  YOB: 1960  Age:           59 Years 3 Months      History & Examination:  Referred by Dr. House for EMG to evaluate dysphagia.    Shalini is a 59-year-old patient with dysphagia.  Swallow study shows problems more with right-sided muscle weakness compared to left.  She does endorse some fatigability.  She does not endorse symptoms elsewhere in her body such as fatigable weakness in the arms or legs.  No fasciculations are seen on examination.  The patient's voice sounds slightly dysarthric this morning but she reports this is her baseline.    Techniques:  Sensory and Motor conduction studies were done with surface recording electrodes. EMG was done with a concentric needle electrode.     Results:  Sensory nerve conduction studies:  Left upper extremities examined rather than right due to the patient's past history of right arm surgeries.  Left median and ulnar antidromic sensory studies are within normal limits.  Left radial study is within normal limits.  Palmar studies are within normal limits.    Motor nerve conduction studies:  Left median and ulnar motor nerve conduction studies are within normal limits.    Repetitive stimulation of the right abductor digit he minimi and right nasalis are within normal limits.    Needle examination in the facial muscles reveals increased amplitude and duration unit potentials with mild reduction when comparing the right side of the tongue to the left side of the tongue.  There were also mild neurogenic changes seen in the left first dorsal interosseous muscle.  No fasciculations were seen throughout the examination including in the thoracic paraspinal region.    Interpretation:  The EMG shows mild abnormalities of a neurogenic appearance and tongue musculature on the right compared to the  left is could represent a localized injury to cranial nerve XII on the right side. Clinical correlation is strongly recommended as to whether this is a significant finding.  It is chronic and inactive in appearance and does not appear to be consistent with a diffuse motor neuron disease.  No evidence of neuromuscular junction disorder either.      EMG Physician:    Michelle Newton MD FAAN        Sensory NCS      Nerve / Sites Rec. Site Onset Peak Ref. NP Amp Ref. PP Amp Dist Jun Ref. Temp     ms ms ms  V  V  V cm m/s m/s  C   L MEDIAN - Dig II Anti      Wrist Dig II 2.71 3.75  24.2 10.0 38.8 14 51.7 48.0 31.6   L ULNAR - Dig V Anti      Wrist Dig V 2.50 3.28  24.6 8.0 45.0 12.5 50.0 48.0 31.7   L RADIAL - Snuff      Forearm Snuff 1.98 2.55  35.1 15.0 45.8 12.5 63.2 48.0 32   L MEDIAN - Ulnar - Palmar      Median Wrist 1.67 2.24 2.40 60.3  53.3 8 48.0  31.9      Ulnar Wrist 1.51 2.03 2.40 36.7  36.0 8 53.0  31.7       Motor NCS      Nerve / Sites Rec. Site Lat Ref. Amp Ref. Rel Amp Dist Jun Ref. Dur. Area Temp.     ms ms mV mV % cm m/s m/s ms %  C   L MEDIAN - APB      Wrist APB 3.80 4.40 7.2 5.0 100 8   8.23 100 32.4      Elbow APB 7.81  6.8  94.2 20 49.9 48.0 8.23 94.4 32.4   L ULNAR - ADM      Wrist ADM 2.29 3.50 9.8 5.0 100 8   7.86 100 32      B.Elbow ADM 5.99  9.6  98 18 48.7 48.0 8.02 96.7 32      A.Elbow ADM 7.55  9.3  95.4 8 51.2 48.0 7.97 90.2 32       Rep Nerve Stim 6      Muscle / Train Time Rate Amp 4-1 Fac Ampl Area 4-1 Fac Area     pps mV % % mVms % %   R ABD DIG MIN (UL)   Baseline 0:00:00 3 9.3 2.3 100 33.0 1.4 100   Post Exercise : 1 0:01:14 3 10.3 1.8 110 35.8 4.6 109   2 0:02:22 3 10.0 -0.5 107 34.6 4.5 105   3 0:03:49 3 9.9 -1.7 106 31.8 9.2 96.5   R NASALIS   Baseline 0:00:00 3 1.4 6.1 100 5.6 3 100   Post Exercise 1 0:01:29 3 1.5 5.4 111 5.3 -0.3 93.3   2 0:02:39 3 1.5 5 113 5.9 -0.3 104   3 0:03:50 3 1.6 4.3 115 6.0 1.2 107       EMG Summary Table     Spontaneous MUAP Recruitment    IA Fib/PSW  Fasc H.F. Amp Dur. PPP Pattern   L. FIRST D INTEROSS N None None None 1+ 1+ N N   L. PRON TERES N None None None N N N N   L. EXT INDICIS N None None None N N N N   L. BICEPS N None None None N N N N   L. TRICEPS N None None None N N N N   L. GENIOGLOSSUS N None None None N N N N   R. GENIOGLOSSUS N None None None 1+ 1+ N Mildly Reduced   R. MASSETER N None None None N N N N   L. MASSETER N None None None N N N N   R. BICEPS N None None None N N N N   R. TRICEPS N None None None N N N N   R. DELTOID N None None None N N N N   R. THOR PSP (M) N None None None N N N N   R. THOR PSP (L) N None None None N N N N   R. THOR PSP (U) N None None None N N N N

## 2019-05-28 ENCOUNTER — TELEPHONE (OUTPATIENT)
Dept: OTOLARYNGOLOGY | Facility: CLINIC | Age: 59
End: 2019-05-28

## 2019-05-28 NOTE — TELEPHONE ENCOUNTER
"Patient called questioning if she \"had scope with dilation such as an endoscopy, would that re-injure the 12th cranial nerve?\" Do I need to see Dr. Mujica sooner? Dr. Mujica out of clinic. Discussed with Dr. Genao who stated that it shouldn't injure the patient or the 12th cranial nerve. Told patient to keep her appointment with Dr. Mujica on July 1st.   "

## 2019-07-01 ENCOUNTER — PRE VISIT (OUTPATIENT)
Dept: OTOLARYNGOLOGY | Facility: CLINIC | Age: 59
End: 2019-07-01

## 2019-07-01 ENCOUNTER — OFFICE VISIT (OUTPATIENT)
Dept: OTOLARYNGOLOGY | Facility: CLINIC | Age: 59
End: 2019-07-01
Payer: COMMERCIAL

## 2019-07-01 VITALS
HEIGHT: 65 IN | WEIGHT: 183 LBS | SYSTOLIC BLOOD PRESSURE: 102 MMHG | BODY MASS INDEX: 30.49 KG/M2 | DIASTOLIC BLOOD PRESSURE: 54 MMHG

## 2019-07-01 DIAGNOSIS — R13.10 DYSPHAGIA, UNSPECIFIED TYPE: Primary | ICD-10-CM

## 2019-07-01 DIAGNOSIS — R49.0 DYSPHONIA: ICD-10-CM

## 2019-07-01 DIAGNOSIS — R13.10 DYSPHAGIA, UNSPECIFIED TYPE: ICD-10-CM

## 2019-07-01 DIAGNOSIS — J38.01 UNILATERAL PARTIAL VOCAL CORD PARALYSIS: ICD-10-CM

## 2019-07-01 DIAGNOSIS — R47.1 DYSARTHRIA: ICD-10-CM

## 2019-07-01 DIAGNOSIS — F41.9 ANXIETY: ICD-10-CM

## 2019-07-01 DIAGNOSIS — R13.12 OROPHARYNGEAL DYSPHAGIA: Primary | ICD-10-CM

## 2019-07-01 ASSESSMENT — MIFFLIN-ST. JEOR: SCORE: 1408.02

## 2019-07-01 NOTE — PROGRESS NOTES
"[facility] VOICE CLINIC  Alfredo Barba Jr., M.D., F.A.C.S.  Tamara Mujica M.D., M.P.H.  Neva Hartmann, Ph.D., CCC/SLP  Audelia Walsh M.M. (voice), M.A., CCC/SLP  Nathan Lemus M.M. (voice), M.A., CCC/SLP    Evaluation report    Clinician: Audelia Walsh M.M. (voice), M.A., CCC/SLP  Seen in conjunction with: Dr. Mujica  Referring physician:  [place]  Patient: Shalini Natarajan  Date of Visit: 7/1/2019    HISTORY  Chief complaint: Shalini Natarajan is a 59 year old presenting today for evaluation of throat and swallow issues.  Salient history: She has a history significant for globus sensation and mild dysphagia.   She has been under the care of the team at [facility], and was diagnosed with a Schotzki's Ring and GERD.  She has lost weight in the past due to decreased food intake.    With wife, Leilani (pharmacist), today.    CURRENT SYMPTOMS INCLUDE  VOICE    Wife thinks that she tends to be raspy in the morning, as she struggles to hear what Shalini says more than in the after noon (has a hearing impairment).    SPEECH: Seems more impacted than voice.  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  Ms. Natarajan has not noticed a weakness, until it was mentioned, but did report that she is \"known to mumble\"    THROAT ISSUES  o Long Hx of coughing/throat clearing.  o Has tried a steroid inhaler in the past with limited benefit.  How do the cough/throat-clearing symptoms vary? Worse in the AM    Worse at meals    Worse with exertion    On and off   Over time, how has the cough/throat-clearing problem changed? Same   Is there a tickle in your throat prior to coughing or throat clearing? No   What % of the time do you feel like you can control the urge to cough? 50%   Do any of the following trigger your cough? Eating    Post-nasal drainage    Lying down     SWALLOWING    Saw [facility] recently.  An endoscopy was completed, they but did not stretch her esophagus " this time.    She has been stretched every 2-3 years since her early 30s.    Her swallow of bolus tends to go down one side. Better with head to left, than right.    Clinical impressions from Ms. Pippa oDdd, Speech-Language Pathologist on 5/7/19 were as follows:  Clinical Impression Comments Pt seen for a video swallow study per MD orders.  Oral mechanism exam is significant for slight weakness on L side as well as reduced tone of the soft palate on the L side.  Oral skills significant for reduced bolus formation/control and bolus fractionation; pt was unable to move the barium tablet posteriorly in order to elicit the pharyngeal swallow response.  Fractionation occurred with thin liquids, nectar thick liquids, and pureed textures.  Pharyngeal swallow response delayed to the level of the pyriforms upon trials of thin and nectar consistencies.  Swallow triggered at the valleculae with purees and solid textures.  No pharyngeal residue remained after pharyngeal swallow response with adequate epiglottic inversion observed.  There was no evidence of aspiration or penetration across textures.  A CP bar was observed but it did not appear to have a functional impact on bolus movement.  Pt benefitted from cue to swallow 'hard and fast' as pt exhibited reduced bolus fractionation and reduced spillage.  In addition, when turned in AP view, pt noted to be asymmetrical with preference in sending the bolus to the R side.  Pt also exhibited reduced spillage and reported increased ease of swallow when eliciting a head turn to the L with worsening function and report of increase difficulty when eliciting a head turn to the R.  It should be noted, that while atypical dysfunction is noted, a contributing factor appears to be pt fear and anxiety related to swallowing.  Pt is at increased risk for aspiration; however, etiology of dysfunction at this time is unknown.  Recommend continue regular texture diet and thin liquids with  "pt self selecting softer preferred items.  Pt would further benefit from taking small bites/sips at a slow pace and implementing chin tuck/head turn to the L and swallowing 'hard and fast.'  Given the anatomical asymmetry, atypical presentation and c/o intermittent sluggish speech, would recommend consult with laryngologist as well as further neurological work up.  Pt seen for treatment this date to educate and instruct on aspiration precautions/strategies.  Further intervention to be determined based on information obtained from above noted consults.         RESPIRATION    Denies issues    ADDITIONAL    Last year went to [facility].  Her wife reports that they were recommended to complete allergy testing, and found that she is allergic to dogs and grass.    OTHER PERTINENT HISTORY    Complex medical history: please also refer to Dr. Mujica's dictation.     No past medical history on file.  No past surgical history on file.    OBJECTIVE  PATIENT REPORTED MEASURES  Patient Supplied Answers To VHI Questionnaire  Voice Handicap Index (VHI-10) 7/1/2019   My voice makes it difficult for people to hear me 2   My voice difficulties restrict my personal and social life.  0   My voice problem causes me to lose income 0   I feel as though I have to strain to produce voice 0   The clarity of my voice is unpredictable 0   My voice problem upsets me 0   My voice makes me feel handicapped 0   People ask, \"What's wrong with your voice?\" 0   VHI-10 2       Patient Supplied Answers To CSI Questionnaire  Cough Severity Index (CSI) 7/1/2019   My cough is worse when I lie down 4   My coughing problem causes me to restrict my personal and social life 0   I tend to avoid places because of my cough problem 0   I feel embarrassed because of my coughing problem 0   People ask, ''What's wrong?'' because I cough a lot 0   I run out of air when I cough 0   My coughing problem affects my voice 0   My coughing problem limits my physical " activity 0   My coughing problem upsets me 2   People ask me if I am sick because I cough a lot 0   CSI Score 6       Patient Supplied Answers To EAT Questionnaire  Eating Assessment Tool (EAT-10) 7/1/2019   My swallowing problem has caused me to lose weight 2   My swallowing problem interferes with my ability to go out for meals 3   Swallowing liquids takes extra effort 4   Swallowing solids takes extra effort 3   Swallowing pills takes extra effort 4   Swallowing is painful 0   The pleasure of eating is affected by my swallowing 4   When I swallow food sticks in my throat 3   I cough when I eat 2   Swallowing is stressful 4   EAT-10 29       PERCEPTUAL EVALUATION (CPT 90753)  POSTURE / TENSION:     upper body    neck and shoulders    BREATHING:     appears within normal limits and adequate     LARYNGEAL PALPATION:     firm musculature    tenderness of the thyrohyoid area; marked    reduced thyrohyoid space    additional closure of the thyrohyoid space on phonation    VOICE:    Roughness: Mild    Breathiness: WNL    Strain: WNL    Loudness    Conversational speech:  Mild to moderately reduced    Projected speech:  n/a    Pitch:    Conversational speech:  Mildly lowered    Pitch glide: neurologically normal    Resonance:    Conversational speech:  laryngeal pharyngeal resonance    Singing vs. Speech:  Same effort for both activities    CAPE-V Overall Severity:  15/100    ORAL MOTOR EXAMINATION    Face: Normal mobility, although lips appear slightly weak on R>L while speaking.    Lip Strength: Intact    Jaw Strength: Intact    Tongue Strength: Mildly weak on Right    Teeth:    Upper and lower intact    Alternating motion rates (AMRs): Within normal functional limits    Sequential motion rates (SMRs): Within normal functional limits.      COUGH/THROAT CLEARING:    Not observed    LARYNGEAL FUNCTION STUDIES (CPT 61824)  Not warranted today    LARYNGEAL EXAMINATION  Procedure: Flexible endoscopy with chip-tip technology  without stroboscopy, left nostril; topical anesthesia with 3% Lidocaine and 0.25% phenylephrine was applied.   Performed by: Dr. Mujica   The laryngeal and pharyngeal structures were evaluated for gross appearance, mobility, function, and focal lesions / abnormalities of the associated mucosa.  Stroboscopy was warranted to evaluate closure, symmetry, and vibratory characteristics of the vocal folds.  All findings were within normal limits with the exception of the following salient features:     No significant weakness R vs L observed in soft palate from the nasopharynx, although Dr. Mujica did note weakness on the right when observing through the mouth    No visible pooling in the vallecula or piriform sinuses    Thick sticky mucus was observed in the immediate upper airway, which eventually cleared with a hard cough.    The right vocal fold was consistently irregular/ sluggish in its mobility when compared to the left.    Mild erythema of the surface mucosa bilaterally    THERAPY PROBES: Improvement was elicited with use of forward resonant stimuli, coordination of respiration and phonation, use of glottic coup to promote vocal fold closure, use of yawn sigh and use of rescue breathing strategies.      Right arytenoid and vocal fold appear reduced in mobility vs the left.    The laryngeal exam was reviewed with Ms. Natarajan, and I provided pertinent explanations, as well as written and oral information.    ASSESSMENT / PLAN  IMPRESSIONS: Shalini Natarajan is a 59 year old female, presenting today with R49.0 (Dysphonia) in the context of  Oral-pharyngeal Dysphagia in the context of other noted right sided weakness, which includes soft palate and tongue, as evidenced by evaluation and the laryngeal exam.  Remarkable findings of the evaluations included reduced mobility of the right vocal fold.    Dysphonia and dysphagia may be partly related to muscle tension compounded by the hyperfunction, poor function and  imbalanced function of the intrinsic and extrinsic laryngeal musculature  .    STIMULABILITY: results of therapy probes during perceptual and laryngeal evaluation demonstrate improvement with use of forward resonant stimuli, coordination of respiration and phonation, use of glottic coup to promote vocal fold closure, use of yawn sigh and use of rescue breathing strategies.    RECOMMENDATIONS:     A course of speech therapy is recommended to optimize vocal technique, improve voice quality, promote reduced discomfort, effort and fatigue and help reduce mucosal irritation.    She demonstrates a Good prognosis for improvement given adherence to therapeutic recommendations.     Positive indicators: positive response to therapy probes diagnosis is known to respond to treatment    Negative indicators: none    DURATION / FREQUENCY: 3 one-hour sessions biweekly sessions.  A total of 6-8 sessions may be necessary.    GOALS:  Patient goal:   1. To improve and maintain a healthy voice quality  2. To resolve the vocal fold lesions  3. To understand the problem and fix it as much as possible    Long-term goal(s): In 6 months, Ms. Natarajan will:  1. Report a week of typical vocal activities, in which muscle tension dysphonia and dysphagia symptoms do not exceed a level of 2 out of 10, 90% of the time   2. Report resolution of symptoms, and use of optimal voice quality and comfort to meet personal, social, and professional needs, 90% of the time during a typical week of vocal activities    This treatment plan was developed with the patient who agreed with the recommendations.    _______________________________________________________________________  THERAPY NOTE (CPT 86702)  Date of Service: 7/1/2019    SUBJECTIVE / OBJECTIVE:  Please refer to my evaluation report from today's encounter for full details regarding subjective data, patient reported measures, and diagnostic findings.    THERAPEUTIC ACTIVITIES      Evan several  strategies to help ensure a safe and independent swallow function, including improved coordination of breath flow while swallowing and a chin tuck to the left; this was helpful.    Counseling and Education    Asked many questions about the nature of her symptoms, and I answered all of these thoroughly.    I recommended working with a clinical massage therapist; they were amenable this recommendation    I recommended and provided additional education related to working with a health psychologist; this was helpful.  I will ask Dr. Mujica to put the order through.    Concepts of an optimal regimen for practice were instructed.  o She should use an interval schedule of practice, with brief periods of practice frequently throughout each day  o [place] concepts of volitional practice to facilitate motor learning.    ASSESSMENT/PLAN  PROGRESS TOWARD LONG TERM GOALS:   Minimal at this point, as this is first session, but good learning today    IMPRESSIONS: R49.0 (Dysphonia) in the context of  Oral-pharyngeal Dysphagia.     PLAN: 2 APPTS - NEXT : MASSAGE AND BREATHING WORK     TOTAL SERVICE TIME: 90 minutes  EVALUATION OF VOICE AND RESONANCE (27428)  TREATMENT (27040)  NO CHARGE FACILITY FEE (72291)    Audelia Walsh M.M. (voice) MRAISA., CCC/SLP  Speech-Language Pathologist  [facility] Trained Vocologist  [facility]  [phone]  [email or web address]  Prounouns: she/her

## 2019-07-01 NOTE — LETTER
"7/1/2019      RE: Shalini Natarajan  [address]  [place] [address]       Dear Dr. Medina:    I had the pleasure of meeting Shalini Natarajan in consultation at the [facility] the [facility] at your request, for evaluation of dysphagia, chronic cough and chronic throat-clearing. The note from our visit follows. Speech recognition software may have been used in the documentation below; input is reviewed before signature to the best of my ability. I appreciate the opportunity to participate in the care of this pleasant patient.    Please feel free to contact me with any questions.    Sincerely yours,    Tamara Mujica M.D., M.P.H.  , Laryngology  Director, [facility]  Otolaryngology- Head & Neck Surgery  [phone]          =====    History of Present Illness:   Shalini Natarajan is a pleasant 59-year-old female with a history of Schatzki's ring, esophageal web who presents with a several year history of dysphagia, chronic cough, chronic throat-clearing and throat concerns. Symptoms include mucus in throat and frequent throat-clearing.      Voice  No concerns.       Swallowing  She notes a several year history of gradual worsening of swallowing. In December 2018, her swallow significantly worsened.  She feels she can chug but cannot sip.  She feels swallow can be worse with stress.  She sometimes has globus sensation.  The patient has had multiple esophageal dilations in the past.    Per prior documentation from Pippa Dodd SLP:  \"Pt and wife notably frustrated with swallow deficits.  Pt and partner report ongoing difficulty for ~ 2 years but increased difficulty since December 2018.  Pt and partner report difficulty as sensation of items remaining in throat down to the sternal notch.  Pt reports variable difficulty across textures including liquids with improvement in swallowing occur when she is distracted, " "standing, or using a straw.  Pt reports further benefit from tucking her chin.  In addition, pt reports improved function when taking xanax prior to intake.  Pt reports to choke on items and reported her last choking episode occurred ~2 weeks ago.  Pt denies coughing with intake.  Pt further reports speech as intermittently sluggish and describes thick tongue. \" Swallow study results are as listed below. She had similar but less extensive findings in prior swallow study completed in 2017.    Neurology evaluation with Dr. Laureano suggested possible bulbar neuromuscular process. Patient also underwent a tongue EMG which showed neurogenic abnormalities of the right tongue.     Xanax is helping with her swallow significantly.    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 The prominent cricopharyngeus has been observed on prior swallow studies but was not felt to be obstructive.     She does experience increased swallowing effort with all textures.      Cough/Throat-clearing  She also reports a long history of cough/throat clearing.  It is worse in the mornings, with meals, and with exertion.  No preceding tickle.  It is 50% controllable.  Triggers include eating, post-nasal drainage, and lying down.  A steroid inhaler has been tried for this.      Breathing  No concerns.       Throat discomfort  No concerns.       Reflux-type symptoms  She experiences heartburn/indigestion weekly. She is taking reflux medications.  She has an extensive prior Gastroenterology history related to Schatzki's ring and dilations.      Prior Epic and outside records were reviewed for this visit.      MEDICATIONS:     Current Outpatient Medications   Medication Sig Dispense Refill     ALPRAZolam (XANAX) 0.5 MG tablet Take 0.5 mg by mouth       cetirizine HCl (ZYRTEC ALLERGY) 10 MG " CAPS        estradiol (VIVELLE-DOT) 0.075 MG/24HR BIW patch Place 1 patch onto the skin       fluticasone (FLONASE) 50 MCG/ACT nasal spray 1 spray       melatonin 5 MG CAPS Take 5 mg by mouth       naproxen sodium (ALEVE) 220 MG tablet Take 220 mg by mouth       omeprazole (PRILOSEC) 40 MG DR capsule Take 1 capsule by mouth       pregabalin (LYRICA) 50 MG capsule Take 50 mg by mouth       PROAIR  (90 Base) MCG/ACT inhaler   3       ALLERGIES:    Allergies   Allergen Reactions     Dogs      Grass      Mold      Trees        PAST MEDICAL HISTORY: No past medical history on file.   S/P lumpectomy, right breast 03/05/2019   Radial scar of breast 02/14/2019   Meniscus tear 04/24/2015     Gastroesophageal reflux disease   per H & P   Swallowing difficulty 2017             PAST SURGICAL HISTORY: No past surgical history on file.   BUNIONECTOMY   Right per patient     RIGHT KNEE ARTHROSCOPY WITH PARTIAL MEDIAL MENISCECTOMY 4/24/15 Right RIGHT KNEE ARTHROSCOPY WITH PARTIAL MEDIAL MENISCECTOMY     BREAST BIOPSY 02/12/2019 Right Needle biopsy     HAND SURGERY 8/1/2018 - 8/31/2018 Right hardware in base of right hand     OSTEOTOMY AND ULNAR SHORTENING 12/1/2018 - 12/31/2018 Right      DEBBIE AND BSO     per patient     BREAST SURGERY 02/19/2019 Right benign             HABITS/SOCIAL HISTORY:    Social History     Tobacco Use     Smoking status: Not on file   Substance Use Topics     Alcohol use: Not on file     Light Tobacco Smoker Cigarettes 0.25   Started: 1982   Smokeless Tobacco: Never Used           Tobacco Cessation: Ready to Quit: No; Counseling Given: No     Alcohol Use Drinks/Week oz/Week Comments   Yes     Occasionally         FAMILY HISTORY:  No family history on file. Noncontributory.    REVIEW OF SYSTEMS:  The patient completed a comprehensive 11 point review of systems (below), which was reviewed. Positives are as noted below; pertinent findings are as noted in the HPI.     Patient Supplied Answers to Review  of Systems   ENT ROS 7/1/2019   Constitutional -   Ears, Nose, Throat Ear pain, Nasal congestion or drainage, Trouble swallowing   Musculoskeletal Sore or stiff joints, Neck pain   Allergy/Immunology Allergies or hay fever   Hematologic Easy bruising   Endocrine Heat or cold intolerance       PHYSICAL EXAMINATION:  General: The patient was alert and conversant, and in no acute distress. Patient accompanied by her spouse.  Eyes: PERRL, conjunctiva and lids normal, sclera nonicteric.  Nose: Anterior rhinoscopy: no gross abnormalities. no  bleeding; no  mucopurulence; septum grossly normal, mild mucoid drainage and/or crusting.  Oral cavity/oropharynx: No masses or lesions. Dentition in fair condition. Floor of mouth and oral tongue soft to palpation. Tongue mobility and palate elevation intact and symmetric.  Ears: Normal auricles, external auditory canals bilaterally. Visualized portions of tympanic membranes normal bilaterally.   Neck: No palpable cervical lymphadenopathy. There was moderate  tenderness to palpation of the thyrohyoid space, which was narrow. No obvious thyroid abnormality. Landmarks palpable.  Resp: Breathing comfortably, no stridor or stertor.  Neuro: Symmetric facial function. Other cranial nerves as documented above.  Psych: Normal affect, pleasant and cooperative.  Voice/speech: Mild dysphonia characterized by roughness.  Extremities: No cyanosis, clubbing, or edema of the upper extremities.    Intake scores  Total Score for Last Patient-Answered VHI Questionnaire  VHI Total Score 7/1/2019   VHI Total Score Incomplete     Total Score for Last Patient-Answered EAT Questionnaire  EAT Total Score 7/1/2019   EAT Total Score 13     Total Score for Last Patient-Answered CSI Questionnaire  CSI Total Score 7/1/2019   CSI Total Score 6         PROCEDURE:   Flexible fiberoptic laryngoscopy  Indications: This procedure was warranted to evaluate the patient's laryngeal anatomy and function. Risks,  benefits, and alternatives were discussed.  Description: After written informed consent was obtained, a time-out was performed to confirm patient identity, procedure, and procedure site. Topical 3% lidocaine with 0.25% phenylephrine was applied to the nasal cavities. I performed the endoscopy and no complications were apparent.  Performed by: Tamara Mujica MD MPH  SLP: Audelia Walsh MM, MA, CCC-SLP   Findings: Normal nasopharynx; no VPI. Normal base of tongue, valleculae, and epiglottis. The right true vocal fold demonstrated impaired mobility. The left true vocal fold demonstrated normal mobility. The medial edges of the vocal folds appeared smooth and straight. No focal mucosal lesions were observed on the true vocal folds. Glissade produced appropriate elongation. There was moderate supraglottic recruitment with connected speech. Mucosa of the false vocal folds, aryepiglottic folds, piriform sinuses, and posterior glottis unremarkable. Airway was patent. Response to the therapy probes was good..   No lesions on NBI.          IMAGING/RESULTS reviewed, with pertinent report excerpts below:  VFSS 5/7/19  IMPRESSION:   1. No penetration or aspiration with any consistency.  2. Piecemeal deglutition with consistent premature spillage to the  level of the piriform sinuses and difficulty with initiation of  swallowing reflex.  SLP report:  Oral mechanism exam is significant for slight weakness on L side as well as reduced tone of the soft palate on the L side.  Oral skills significant for reduced bolus formation/control and bolus fractionation; pt was unable to move the barium tablet posteriorly in order to elicit the pharyngeal swallow response.  Fractionation occurred with thin liquids, nectar thick liquids, and pureed textures.  Pharyngeal swallow response delayed to the level of the pyriforms upon trials of thin and nectar consistencies.  Swallow triggered at the valleculae with purees and solid textures.  No  pharyngeal residue remained after pharyngeal swallow response with adequate epiglottic inversion observed.  There was no evidence of aspiration or penetration across textures.  A CP bar was observed but it did not appear to have a functional impact on bolus movement.  Pt  benefited from cue to swallow 'hard and fast' as pt exhibited reduced bolus fractionation and reduced spillage.  In addition, when turned in AP view, pt noted to be asymmetrical with preference in sending the bolus to the R side.  Pt also exhibited reduced spillage and reported increased ease of swallow when eliciting a head turn to the L with worsening function and report of increase difficulty when eliciting a head turn to the R.  It should be noted, that while atypical dysfunction is noted, a contributing factor appears to be pt fear and anxiety related to swallowing.  Pt is at increased risk for aspiration; however, etiology of dysfunction at this time is unknown.  Recommend continue regular texture diet and thin liquids with pt self selecting softer preferred items.  Pt would further benefit from taking small bites/sips at a slow pace and implementing chin tuck/head turn to the L and swallowing 'hard and fast.'  Given the anatomical asymmetry, atypical presentation and c/o intermittent sluggish speech, would recommend consult with laryngologist as well as further neurological work up.  Pt seen for treatment this date to educate and instruct on aspiration precautions/strategies.      EMG 5/13/19:  The EMG shows mild abnormalities of a neurogenic appearance and tongue musculature on the right compared to the left is could represent a localized injury to cranial nerve XII on the right side. Clinical correlation is strongly recommended as to whether this is a significant finding.  It is chronic and inactive in appearance and does not appear to be consistent with a diffuse motor neuron disease.  No evidence of neuromuscular junction disorder  either.      IMPRESSION AND PLAN:   Shalini Natarajan presents with dysarthria, dysphagia, multiple levels of swallow dysfunction, right tongue weakness, and right vocal fold paresis. Seems like she has a significant component of fear/anxiety. Xanax is helpful, and she has developed some compensatory strategies, but she is afraid to eat alone because of risk of choking.    Recommendations:  1) CT scan of the neck with contrast given right vocal fold paresis, right tongue weakness.  2) We could consider speech therapy to determine whether reducing muscle recruitment with voice use is helpful for improving swallow function.  3) If steps above do not identify specific issues, recommended that she return to Neurology.  4) Also discussed consideration of Health Psychology referral as she is having significant anxiety even when she is doing a bit better. She does have very real reasons to be fearful given her history of choking,but the fear may be making her swallow more difficult. However she feels that mostly it is the swallow causing her anxiety rather than vice versa so we will hold off on this.    The timing of her return will depend on findings from the steps above. I appreciate the opportunity to participate in the care of this patient.             Tamara Mujica MD

## 2019-07-01 NOTE — LETTER
"7/1/2019       RE: Shalini Natarajan  [address]  [place] [address]     Dear Colleague,    Thank you for referring your patient, Shalini Natarajan, to the [facility] at [facility]. Please see a copy of my visit note below.    [facility]  Alfredo Barba Jr., M.D., F.A.C.S.  Tamara Mujica M.D., M.P.H.  Neva Hartmann, Ph.D., CCC/SLP  Audelia Walsh M.M. (voice), M.A., CCC/SLP  Nathan Lemus M.M. (voice), M.A., CCC/SLP    Evaluation report    Clinician: Audelia Walsh M.M. (voice), M.A., CCC/SLP  Seen in conjunction with: Dr. Mujica  Referring physician:  [place]  Patient: Shalini Natarajan  Date of Visit: 7/1/2019    HISTORY  Chief complaint: Shalini Natarajan is a 59 year old presenting today for evaluation of throat and swallow issues.  Salient history: She has a history significant for globus sensation and mild dysphagia.   She has been under the care of the team at [facility], and was diagnosed with a Schotzki's Ring and GERD.  She has lost weight in the past due to decreased food intake.    With wife, Leilani (pharmacist), today.    CURRENT SYMPTOMS INCLUDE  VOICE    Wife thinks that she tends to be raspy in the morning, as she struggles to hear what Shalini says more than in the after noon (has a hearing impairment).    SPEECH: Seems more impacted than voice.  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  Ms. Natarajan has not noticed a weakness, until it was mentioned, but did report that she is \"known to mumble\"    THROAT ISSUES  o Long Hx of coughing/throat clearing.  o Has tried a steroid inhaler in the past with limited benefit.  How do the cough/throat-clearing symptoms vary? Worse in the AM    Worse at meals    Worse with exertion    On and off   Over time, how has the cough/throat-clearing problem changed? Same   Is there a tickle in your throat prior to coughing or throat clearing? No   What % " of the time do you feel like you can control the urge to cough? 50%   Do any of the following trigger your cough? Eating    Post-nasal drainage    Lying down     SWALLOWING    Saw [facility] recently.  An endoscopy was completed, they but did not stretch her esophagus this time.    She has been stretched every 2-3 years since her early 30s.    Her swallow of bolus tends to go down one side. Better with head to left, than right.    Clinical impressions from Ms. Pippa Dodd, Speech-Language Pathologist on 5/7/19 were as follows:  Clinical Impression Comments Pt seen for a video swallow study per MD orders.  Oral mechanism exam is significant for slight weakness on L side as well as reduced tone of the soft palate on the L side.  Oral skills significant for reduced bolus formation/control and bolus fractionation; pt was unable to move the barium tablet posteriorly in order to elicit the pharyngeal swallow response.  Fractionation occurred with thin liquids, nectar thick liquids, and pureed textures.  Pharyngeal swallow response delayed to the level of the pyriforms upon trials of thin and nectar consistencies.  Swallow triggered at the valleculae with purees and solid textures.  No pharyngeal residue remained after pharyngeal swallow response with adequate epiglottic inversion observed.  There was no evidence of aspiration or penetration across textures.  A CP bar was observed but it did not appear to have a functional impact on bolus movement.  Pt benefitted from cue to swallow 'hard and fast' as pt exhibited reduced bolus fractionation and reduced spillage.  In addition, when turned in AP view, pt noted to be asymmetrical with preference in sending the bolus to the R side.  Pt also exhibited reduced spillage and reported increased ease of swallow when eliciting a head turn to the L with worsening function and report of increase difficulty when eliciting a head turn to the R.  It should be noted, that while atypical  "dysfunction is noted, a contributing factor appears to be pt fear and anxiety related to swallowing.  Pt is at increased risk for aspiration; however, etiology of dysfunction at this time is unknown.  Recommend continue regular texture diet and thin liquids with pt self selecting softer preferred items.  Pt would further benefit from taking small bites/sips at a slow pace and implementing chin tuck/head turn to the L and swallowing 'hard and fast.'  Given the anatomical asymmetry, atypical presentation and c/o intermittent sluggish speech, would recommend consult with laryngologist as well as further neurological work up.  Pt seen for treatment this date to educate and instruct on aspiration precautions/strategies.  Further intervention to be determined based on information obtained from above noted consults.         RESPIRATION    Denies issues    ADDITIONAL    Last year went to [facility].  Her wife reports that they were recommended to complete allergy testing, and found that she is allergic to dogs and grass.    OTHER PERTINENT HISTORY    Complex medical history: please also refer to Dr. Mujica's dictation.     No past medical history on file.  No past surgical history on file.    OBJECTIVE  PATIENT REPORTED MEASURES  Patient Supplied Answers To VHI Questionnaire  Voice Handicap Index (VHI-10) 7/1/2019   My voice makes it difficult for people to hear me 2   My voice difficulties restrict my personal and social life.  0   My voice problem causes me to lose income 0   I feel as though I have to strain to produce voice 0   The clarity of my voice is unpredictable 0   My voice problem upsets me 0   My voice makes me feel handicapped 0   People ask, \"What's wrong with your voice?\" 0   VHI-10 2       Patient Supplied Answers To CSI Questionnaire  Cough Severity Index (CSI) 7/1/2019   My cough is worse when I lie down 4   My coughing problem causes me to restrict my personal and social life 0   I tend to avoid places " because of my cough problem 0   I feel embarrassed because of my coughing problem 0   People ask, ''What's wrong?'' because I cough a lot 0   I run out of air when I cough 0   My coughing problem affects my voice 0   My coughing problem limits my physical activity 0   My coughing problem upsets me 2   People ask me if I am sick because I cough a lot 0   CSI Score 6       Patient Supplied Answers To EAT Questionnaire  Eating Assessment Tool (EAT-10) 7/1/2019   My swallowing problem has caused me to lose weight 2   My swallowing problem interferes with my ability to go out for meals 3   Swallowing liquids takes extra effort 4   Swallowing solids takes extra effort 3   Swallowing pills takes extra effort 4   Swallowing is painful 0   The pleasure of eating is affected by my swallowing 4   When I swallow food sticks in my throat 3   I cough when I eat 2   Swallowing is stressful 4   EAT-10 29       PERCEPTUAL EVALUATION (CPT 88445)  POSTURE / TENSION:     upper body    neck and shoulders    BREATHING:     appears within normal limits and adequate     LARYNGEAL PALPATION:     firm musculature    tenderness of the thyrohyoid area; marked    reduced thyrohyoid space    additional closure of the thyrohyoid space on phonation    VOICE:    Roughness: Mild    Breathiness: WNL    Strain: WNL    Loudness    Conversational speech:  Mild to moderately reduced    Projected speech:  n/a    Pitch:    Conversational speech:  Mildly lowered    Pitch glide: neurologically normal    Resonance:    Conversational speech:  laryngeal pharyngeal resonance    Singing vs. Speech:  Same effort for both activities    CAPE-V Overall Severity:  15/100    ORAL MOTOR EXAMINATION    Face: Normal mobility, although lips appear slightly weak on R>L while speaking.    Lip Strength: Intact    Jaw Strength: Intact    Tongue Strength: Mildly weak on Right    Teeth:    Upper and lower intact    Alternating motion rates (AMRs): Within normal functional  limits    Sequential motion rates (SMRs): Within normal functional limits.      COUGH/THROAT CLEARING:    Not observed    LARYNGEAL FUNCTION STUDIES (CPT 28261)  Not warranted today    LARYNGEAL EXAMINATION  Procedure: Flexible endoscopy with chip-tip technology without stroboscopy, left nostril; topical anesthesia with 3% Lidocaine and 0.25% phenylephrine was applied.   Performed by: Dr. Mujica   The laryngeal and pharyngeal structures were evaluated for gross appearance, mobility, function, and focal lesions / abnormalities of the associated mucosa.  Stroboscopy was warranted to evaluate closure, symmetry, and vibratory characteristics of the vocal folds.  All findings were within normal limits with the exception of the following salient features:     No significant weakness R vs L observed in soft palate from the nasopharynx, although Dr. Mujica did note weakness on the right when observing through the mouth    No visible pooling in the vallecula or piriform sinuses    Thick sticky mucus was observed in the immediate upper airway, which eventually cleared with a hard cough.    The right vocal fold was consistently irregular/ sluggish in its mobility when compared to the left.    Mild erythema of the surface mucosa bilaterally    THERAPY PROBES: Improvement was elicited with use of forward resonant stimuli, coordination of respiration and phonation, use of glottic coup to promote vocal fold closure, use of yawn sigh and use of rescue breathing strategies.      Right arytenoid and vocal fold appear reduced in mobility vs the left.    The laryngeal exam was reviewed with Ms. Natarajan, and I provided pertinent explanations, as well as written and oral information.    ASSESSMENT / PLAN  IMPRESSIONS: Shalini Natraajan is a 59 year old female, presenting today with R49.0 (Dysphonia) in the context of  Oral-pharyngeal Dysphagia in the context of other noted right sided weakness, which includes soft palate and tongue, as  evidenced by evaluation and the laryngeal exam.  Remarkable findings of the evaluations included reduced mobility of the right vocal fold.    Dysphonia and dysphagia may be partly related to muscle tension compounded by the hyperfunction, poor function and imbalanced function of the intrinsic and extrinsic laryngeal musculature  .    STIMULABILITY: results of therapy probes during perceptual and laryngeal evaluation demonstrate improvement with use of forward resonant stimuli, coordination of respiration and phonation, use of glottic coup to promote vocal fold closure, use of yawn sigh and use of rescue breathing strategies.    RECOMMENDATIONS:     A course of speech therapy is recommended to optimize vocal technique, improve voice quality, promote reduced discomfort, effort and fatigue and help reduce mucosal irritation.    She demonstrates a Good prognosis for improvement given adherence to therapeutic recommendations.     Positive indicators: positive response to therapy probes diagnosis is known to respond to treatment    Negative indicators: none    DURATION / FREQUENCY: 3 one-hour sessions biweekly sessions.  A total of 6-8 sessions may be necessary.    GOALS:  Patient goal:   1. To improve and maintain a healthy voice quality  2. To resolve the vocal fold lesions  3. To understand the problem and fix it as much as possible    Long-term goal(s): In 6 months, Ms. Natarajan will:  1. Report a week of typical vocal activities, in which muscle tension dysphonia and dysphagia symptoms do not exceed a level of 2 out of 10, 90% of the time   2. Report resolution of symptoms, and use of optimal voice quality and comfort to meet personal, social, and professional needs, 90% of the time during a typical week of vocal activities  This treatment plan was developed with the patient who agreed with the recommendations.  __________________________________________________________________  THERAPY NOTE (CPT 23951)  Date of  Service: 7/1/2019    SUBJECTIVE / OBJECTIVE:  Please refer to my evaluation report from today's encounter for full details regarding subjective data, patient reported measures, and diagnostic findings.    THERAPEUTIC ACTIVITIES      [place] several strategies to help ensure a safe and independent swallow function, including improved coordination of breath flow while swallowing and a chin tuck to the left; this was helpful.    Counseling and Education    Asked many questions about the nature of her symptoms, and I answered all of these thoroughly.    I recommended working with a clinical massage therapist; they were amenable this recommendation    I recommended and provided additional education related to working with a health psychologist; this was helpful.  I will ask Dr. Mujica to put the order through.    Concepts of an optimal regimen for practice were instructed.  o She should use an interval schedule of practice, with brief periods of practice frequently throughout each day  o [place] concepts of volitional practice to facilitate motor learning.    ASSESSMENT/PLAN  PROGRESS TOWARD LONG TERM GOALS:   Minimal at this point, as this is first session, but good learning today    IMPRESSIONS: R49.0 (Dysphonia) in the context of  Oral-pharyngeal Dysphagia.     PLAN: 2 APPTS - NEXT : MASSAGE AND BREATHING WORK     TOTAL SERVICE TIME: 90 minutes  EVALUATION OF VOICE AND RESONANCE (41191)  TREATMENT (79488)  NO CHARGE FACILITY FEE (10170)    Audelia Walsh M.M. (voice) MRAISA., CCC/SLP  Speech-Language Pathologist  [facility] Trained Vocologist  [facility]  [phone]  [email or web address] [email or web address]  Prounouns: she/her

## 2019-07-01 NOTE — PROGRESS NOTES
"Dear Dr. Medina:    I had the pleasure of meeting Shalini Natarajan in consultation at the [facility] of the [facility] at your request, for evaluation of dysphagia, chronic cough and chronic throat-clearing. The note from our visit follows. Speech recognition software may have been used in the documentation below; input is reviewed before signature to the best of my ability. I appreciate the opportunity to participate in the care of this pleasant patient.    Please feel free to contact me with any questions.    Sincerely yours,    Tamara Mujica M.D., M.P.H.  , Laryngology  Director, [facility] Voice [facility]  Otolaryngology- Head & Neck Surgery  [phone]          =====    History of Present Illness:   Shalini Natarajan is a pleasant 59-year-old female with a history of Schatzki's ring, esophageal web who presents with a several year history of dysphagia, chronic cough, chronic throat-clearing and throat concerns. Symptoms include mucus in throat and frequent throat-clearing.      Voice  No concerns.       Swallowing  She notes a several year history of gradual worsening of swallowing. In December 2018, her swallow significantly worsened.  She feels she can chug but cannot sip.  She feels swallow can be worse with stress.  She sometimes has globus sensation.  The patient has had multiple esophageal dilations in the past.    Per prior documentation from Pippa Dodd SLP:  \"Pt and wife notably frustrated with swallow deficits.  Pt and partner report ongoing difficulty for ~ 2 years but increased difficulty since December 2018.  Pt and partner report difficulty as sensation of items remaining in throat down to the sternal notch.  Pt reports variable difficulty across textures including liquids with improvement in swallowing occur when she is distracted, standing, or using a straw.  Pt reports further benefit from tucking her " "chin.  In addition, pt reports improved function when taking xanax prior to intake.  Pt reports to choke on items and reported her last choking episode occurred ~2 weeks ago.  Pt denies coughing with intake.  Pt further reports speech as intermittently sluggish and describes thick tongue. \" Swallow study results are as listed below. She had similar but less extensive findings in prior swallow study completed in 2017.    Neurology evaluation with Dr. Laureano suggested possible bulbar neuromuscular process. Patient also underwent a tongue EMG which showed neurogenic abnormalities of the right tongue.     Xanax is helping with her swallow significantly.    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 The prominent cricopharyngeus has been observed on prior swallow studies but was not felt to be obstructive.     She does experience increased swallowing effort with all textures.      Cough/Throat-clearing  She also reports a long history of cough/throat clearing.  It is worse in the mornings, with meals, and with exertion.  No preceding tickle.  It is 50% controllable.  Triggers include eating, post-nasal drainage, and lying down.  A steroid inhaler has been tried for this.      Breathing  No concerns.       Throat discomfort  No concerns.       Reflux-type symptoms  She experiences heartburn/indigestion weekly. She is taking reflux medications.  She has an extensive prior Gastroenterology history related to Schatzki's ring and dilations.      Prior Epic and outside records were reviewed for this visit.      MEDICATIONS:     Current Outpatient Medications   Medication Sig Dispense Refill     ALPRAZolam (XANAX) 0.5 MG tablet Take 0.5 mg by mouth       cetirizine HCl (ZYRTEC ALLERGY) 10 MG CAPS        estradiol (VIVELLE-DOT) 0.075 MG/24HR BIW patch Place 1 " patch onto the skin       fluticasone (FLONASE) 50 MCG/ACT nasal spray 1 spray       melatonin 5 MG CAPS Take 5 mg by mouth       naproxen sodium (ALEVE) 220 MG tablet Take 220 mg by mouth       omeprazole (PRILOSEC) 40 MG DR capsule Take 1 capsule by mouth       pregabalin (LYRICA) 50 MG capsule Take 50 mg by mouth       PROAIR  (90 Base) MCG/ACT inhaler   3       ALLERGIES:    Allergies   Allergen Reactions     Dogs      Grass      Mold      Trees        PAST MEDICAL HISTORY: No past medical history on file.   S/P lumpectomy, right breast 03/05/2019   Radial scar of breast 02/14/2019   Meniscus tear 04/24/2015     Gastroesophageal reflux disease   per H & P   Swallowing difficulty 2017             PAST SURGICAL HISTORY: No past surgical history on file.   BUNIONECTOMY   Right per patient     RIGHT KNEE ARTHROSCOPY WITH PARTIAL MEDIAL MENISCECTOMY 4/24/15 Right RIGHT KNEE ARTHROSCOPY WITH PARTIAL MEDIAL MENISCECTOMY     BREAST BIOPSY 02/12/2019 Right Needle biopsy     HAND SURGERY 8/1/2018 - 8/31/2018 Right hardware in base of right hand     OSTEOTOMY AND ULNAR SHORTENING 12/1/2018 - 12/31/2018 Right      DEBBIE AND BSO     per patient     BREAST SURGERY 02/19/2019 Right benign             HABITS/SOCIAL HISTORY:    Social History     Tobacco Use     Smoking status: Not on file   Substance Use Topics     Alcohol use: Not on file     Light Tobacco Smoker Cigarettes 0.25   Started: 1982   Smokeless Tobacco: Never Used           Tobacco Cessation: Ready to Quit: No; Counseling Given: No     Alcohol Use Drinks/Week oz/Week Comments   Yes     Occasionally         FAMILY HISTORY:  No family history on file. Noncontributory.    REVIEW OF SYSTEMS:  The patient completed a comprehensive 11 point review of systems (below), which was reviewed. Positives are as noted below; pertinent findings are as noted in the HPI.     Patient Supplied Answers to Review of Systems  [facility] ENT ROS 7/1/2019   Constitutional -   Ears, Nose,  Throat Ear pain, Nasal congestion or drainage, Trouble swallowing   Musculoskeletal Sore or stiff joints, Neck pain   Allergy/Immunology Allergies or hay fever   Hematologic Easy bruising   Endocrine Heat or cold intolerance       PHYSICAL EXAMINATION:  General: The patient was alert and conversant, and in no acute distress. Patient accompanied by her spouse.  Eyes: PERRL, conjunctiva and lids normal, sclera nonicteric.  Nose: Anterior rhinoscopy: no gross abnormalities. no  bleeding; no  mucopurulence; septum grossly normal, mild mucoid drainage and/or crusting.  Oral cavity/oropharynx: No masses or lesions. Dentition in fair condition. Floor of mouth and oral tongue soft to palpation. Tongue mobility and palate elevation intact and symmetric.  Ears: Normal auricles, external auditory canals bilaterally. Visualized portions of tympanic membranes normal bilaterally.   Neck: No palpable cervical lymphadenopathy. There was moderate  tenderness to palpation of the thyrohyoid space, which was narrow. No obvious thyroid abnormality. Landmarks palpable.  Resp: Breathing comfortably, no stridor or stertor.  Neuro: Symmetric facial function. Other cranial nerves as documented above.  Psych: Normal affect, pleasant and cooperative.  Voice/speech: Mild dysphonia characterized by roughness.  Extremities: No cyanosis, clubbing, or edema of the upper extremities.    Intake scores  Total Score for Last Patient-Answered VHI Questionnaire  VHI Total Score 7/1/2019   VHI Total Score Incomplete     Total Score for Last Patient-Answered EAT Questionnaire  EAT Total Score 7/1/2019   EAT Total Score 13     Total Score for Last Patient-Answered CSI Questionnaire  CSI Total Score 7/1/2019   CSI Total Score 6         PROCEDURE:   Flexible fiberoptic laryngoscopy  Indications: This procedure was warranted to evaluate the patient's laryngeal anatomy and function. Risks, benefits, and alternatives were discussed.  Description: After written  informed consent was obtained, a time-out was performed to confirm patient identity, procedure, and procedure site. Topical 3% lidocaine with 0.25% phenylephrine was applied to the nasal cavities. I performed the endoscopy and no complications were apparent.  Performed by: Tamara Mujica MD MPH  SLP: Audelia Walsh MM, MA, CCC-SLP   Findings: Normal nasopharynx; no VPI. Normal base of tongue, valleculae, and epiglottis. The right true vocal fold demonstrated impaired mobility. The left true vocal fold demonstrated normal mobility. The medial edges of the vocal folds appeared smooth and straight. No focal mucosal lesions were observed on the true vocal folds. Glissade produced appropriate elongation. There was moderate supraglottic recruitment with connected speech. Mucosa of the false vocal folds, aryepiglottic folds, piriform sinuses, and posterior glottis unremarkable. Airway was patent. Response to the therapy probes was good..   No lesions on NBI.          IMAGING/RESULTS reviewed, with pertinent report excerpts below:  VFSS 5/7/19  IMPRESSION:   1. No penetration or aspiration with any consistency.  2. Piecemeal deglutition with consistent premature spillage to the  level of the piriform sinuses and difficulty with initiation of  swallowing reflex.  SLP report:  Oral mechanism exam is significant for slight weakness on L side as well as reduced tone of the soft palate on the L side.  Oral skills significant for reduced bolus formation/control and bolus fractionation; pt was unable to move the barium tablet posteriorly in order to elicit the pharyngeal swallow response.  Fractionation occurred with thin liquids, nectar thick liquids, and pureed textures.  Pharyngeal swallow response delayed to the level of the pyriforms upon trials of thin and nectar consistencies.  Swallow triggered at the valleculae with purees and solid textures.  No pharyngeal residue remained after pharyngeal swallow response with  adequate epiglottic inversion observed.  There was no evidence of aspiration or penetration across textures.  A CP bar was observed but it did not appear to have a functional impact on bolus movement.  Pt benefited from cue to swallow 'hard and fast' as pt exhibited reduced bolus fractionation and reduced spillage.  In addition, when turned in AP view, pt noted to be asymmetrical with preference in sending the bolus to the R side.  Pt also exhibited reduced spillage and reported increased ease of swallow when eliciting a head turn to the L with worsening function and report of increase difficulty when eliciting a head turn to the R.  It should be noted, that while atypical dysfunction is noted, a contributing factor appears to be pt fear and anxiety related to swallowing.  Pt is at increased risk for aspiration; however, etiology of dysfunction at this time is unknown.  Recommend continue regular texture diet and thin liquids with pt self selecting softer preferred items.  Pt would further benefit from taking small bites/sips at a slow pace and implementing chin tuck/head turn to the L and swallowing 'hard and fast.'  Given the anatomical asymmetry, atypical presentation and c/o intermittent sluggish speech, would recommend consult with laryngologist as well as further neurological work up.  Pt seen for treatment this date to educate and instruct on aspiration precautions/strategies.      EMG 5/13/19:  The EMG shows mild abnormalities of a neurogenic appearance and tongue musculature on the right compared to the left is could represent a localized injury to cranial nerve XII on the right side. Clinical correlation is strongly recommended as to whether this is a significant finding.  It is chronic and inactive in appearance and does not appear to be consistent with a diffuse motor neuron disease.  No evidence of neuromuscular junction disorder either.      IMPRESSION AND PLAN:   Shalini Rosa Isela presents with  dysarthria, dysphagia, multiple levels of swallow dysfunction, right tongue weakness, and right vocal fold paresis. Seems like she has a significant component of fear/anxiety. Xanax is helpful, and she has developed some compensatory strategies, but she is afraid to eat alone because of risk of choking.    Recommendations:  1) CT scan of the neck with contrast given right vocal fold paresis, right tongue weakness.  2) We could consider speech therapy to determine whether reducing muscle recruitment with voice use is helpful for improving swallow function.  3) If steps above do not identify specific issues, recommended that she return to Neurology.  4) Also discussed consideration of Health Psychology referral as she is having significant anxiety even when she is doing a bit better. She does have very real reasons to be fearful given her history of choking,but the fear may be making her swallow more difficult. However she feels that mostly it is the swallow causing her anxiety rather than vice versa so we will hold off on this.    The timing of her return will depend on findings from the steps above. I appreciate the opportunity to participate in the care of this patient.

## 2019-07-01 NOTE — PATIENT INSTRUCTIONS
Thank you for choosing [facility] today.    Please follow-up with  as needed.     If you have any further questions or concerns, call us at [phone].

## 2019-07-08 ENCOUNTER — ANCILLARY PROCEDURE (OUTPATIENT)
Dept: CT IMAGING | Facility: CLINIC | Age: 59
End: 2019-07-08
Attending: OTOLARYNGOLOGY
Payer: COMMERCIAL

## 2019-07-08 DIAGNOSIS — R13.10 DYSPHAGIA, UNSPECIFIED TYPE: ICD-10-CM

## 2019-07-08 PROCEDURE — 70491 CT SOFT TISSUE NECK W/DYE: CPT | Mod: TC

## 2019-07-08 RX ORDER — IOPAMIDOL 755 MG/ML
80 INJECTION, SOLUTION INTRAVASCULAR ONCE
Status: COMPLETED | OUTPATIENT
Start: 2019-07-08 | End: 2019-07-08

## 2019-07-08 RX ADMIN — IOPAMIDOL 80 ML: 755 INJECTION, SOLUTION INTRAVASCULAR at 08:20

## 2019-07-26 ENCOUNTER — OFFICE VISIT (OUTPATIENT)
Dept: OTOLARYNGOLOGY | Facility: CLINIC | Age: 59
End: 2019-07-26
Payer: COMMERCIAL

## 2019-07-26 DIAGNOSIS — R49.0 DYSPHONIA: Primary | ICD-10-CM

## 2019-07-26 DIAGNOSIS — R13.12 OROPHARYNGEAL DYSPHAGIA: ICD-10-CM

## 2019-07-26 DIAGNOSIS — R13.10 DYSPHAGIA, UNSPECIFIED TYPE: ICD-10-CM

## 2019-07-26 DIAGNOSIS — J38.01 UNILATERAL PARTIAL VOCAL CORD PARALYSIS: ICD-10-CM

## 2019-07-26 NOTE — PATIENT INSTRUCTIONS
After Visit Summary    Patient: Shalini Natarajan  Date of Visit: 2019    Review Swallow strategies sheet:   - Try to find chin tuck and head turn to the left (head turn only as needed).     - Audelia will check on health psych referral.    Relieving Extrinsic Muscle Discomfort:    Massage  o Please follow instructions as learned today and provided on handout    Breathing:    In the morning and evening (twice daily) for 2-5 minutes:   o Breathe while lying on your back with your face and knees up. Hands on tummy and chest.  Take a breath in with rounded lips and exhale with a  shhhh    o Inhale  = Inflate; exhale = deflate  o 3x each: try breathin in/8 out  o Throughout the day (2-3x/day for just a couple minutes) check breathing while keeping shoulders relaxed (riding to and from school, etc.)  o Use recording of Audelia on your phone to complete square breathing. 2-3x/day      Breathing Tips:  o Breathe in through rounded lips and out with a  shh   o Tongue behind the lower teeth  o Keep shoulders down and chest relaxed    Voice (2-3x/day unless otherwise noted):    Semi-occluded vocal tract exercise:   o Bubbles (straw in 1 to 1.5  of water) 3-4x/day for 1-2 min:  o 2x: blow 10-15 seconds with no voice and keep bubbles consistent.  o 3x: blow bubbles and add a sustained  who  or an  oo  (comfortable pitch)  o 3x: blow bubbles and vary  who  gliding up and down             Up and down like a sine wave  o 1-2x: Happy birthday bubbles (keep connected)  These exercises are great for:    *warm up / cool down - Part of the morning routing and before and after extended voice use.    *tissue mobilization exercise - Improving the condition and pliability of the vocal folds.    *Abdominal breathing and applying optimal breath flow to speech/singing.   Next appt:  - 3pm    We will touch base in 2 weeks to:    - see if the CD by Yanique Trujillo is helpful   - see if her massage chair is helpful   -     Audelia Walsh M.M.  (voice), M.A., CCC/SLP  Speech-Language Pathologist  Certificate of Vocology  [facility]  [phone]  [email or web address]  Prounouns: she/her

## 2019-07-26 NOTE — LETTER
"7/26/2019       RE: Shalini Natarajan  [address]  [place] [address]     Dear Colleague,    Thank you for referring your patient, Shalini Natarajan, to the [facility]. Please see a copy of my visit note below.      [facility] VOICE CLINIC  THERAPY NOTE (CPT 38495)    Patient: Shalini Natarajan  Date of Service: 7/26/2019  Referring physician: Dr. Mujica  Impressions from most recent evaluation (7/1/19):  \"IMPRESSIONS: Shalini Natarajan is a 59 year old female, presenting today with R49.0 (Dysphonia) in the context of  Oral-pharyngeal Dysphagia in the context of other noted right sided weakness, which includes soft palate and tongue, as evidenced by evaluation and the laryngeal exam.  Remarkable findings of the evaluations included reduced mobility of the right vocal fold.    Dysphonia and dysphagia may be partly related to muscle tension compounded by the hyperfunction, poor function and imbalanced function of the intrinsic and extrinsic laryngeal musculature.\"    SUBJECTIVE:  Since her last session, Ms. Natarajan reports the following:     Overall she reports that symptoms have improved somewhat with the therapeutic activities provided.    Successes: able to take more consecutive sips, although still small, but bigger than before. Solid foods have also gotten easier with the strategies learned.    OBJECTIVE:  Ms. Natarajan presents today with the following:  VOICE:  ? Roughness: Mild  ? Breathiness: WNL  ? Strain: WNL  ? Loudness    Conversational speech:  Mild to moderately reduced    Projected speech:  n/a  ? Pitch:    Conversational speech:  Mildly lowered    Pitch glide: neurologically normal      PATIENT REPORTED MEASURES:  Patient Supplied Answers To SLP QOL Questionnaire  Therapy Quality of Life 7/26/2019   Since my l ast session, I used the speech therapy exercises and strategies as recommended by my speech pathologist. Agree strongly   I feel that using my therapy " "techniques has become a habit Agree   I feel confident in my ability to manage my current and future symptoms. Neither agree nor disagree   Since my last session I feel my symptoms have --------. Improved   Overall, since starting therapy I feel my symptoms are --------. Better   Overall, how much better? A little better       THERAPEUTIC ACTIVITIES    Demonstrated previous exercises.  o demonstrated improved technique  o appropriate redirection provided  o instruction provided for increased level of complexity/difficulty    Manual laryngeal massage was performed:    Significant tenderness of the thyrohyoid space with corresponding reduction of space     Thyrohyoid space, greater horns of the hyoid, and base of tongue were targeted with gentle circular massage    Gentle lateralization of the larynx, hyoid tilt, and sternocleidomastoid massage was also performed.    Patient was trained to focus on intentional relaxation of jaw and tongue in addition to area of massage during these maneuvers.    She reports that they are getting a massage chair and she believes that it will be very helpful for reducing her body tension.  Exercises to promote optimal respiratory mechanics    I provided explanation of the anatomy and physiology of respiration for speech and singing; she found this to be helpful    she demonstrated clavicular/neck/shoulder involvement in inhalation    Demonstrated difficulty allowing abdominal relaxation for inhalation    Practiced in a prone and supine position on the massage table, with tactile cue of a hand on the low rib-cage to facilitate awareness of low respiratory engagement.  Progressed to a seated position today.    With clinician support, patient was able to demonstrate improved abdominal relaxation and engagement on inhalation    Optimal exhalation using inward engagement of the abdominal wall with no corresponding collapse of the upper chest cavity was trained using the pulsed \"sh\" " "task    acceptable improvement in airflow and respiratory mechanics    Semi-Occluded Vocal Tract (SOVT) exercises instructed to reduce laryngeal tension, promote vocal fold pliability, and coordinate respiration and phonation    Straw phonation with water resistance was found to be most facilitating     Sustained phonation, and voice vs. voiceless productions used to promote easy voicing and raise awareness of laryngeal tension    Ascending and descending glides utilized to promote vocal fold pliability    \"Messa di voce\", gradual crescendo and decrescendo to vary medial compression was also utilized to promote vocal fold pliability.    Instructed on the benefits of using these exercises for improved coordination of breath flow with phonation and tissue mobilization.    Instructed on the importance of using these exercises as a warm-up / cool down,  and to re-calibrate the voice throughout the day.    Counseling and Education:    Asked many questions about the nature of her symptoms, and I answered all of these thoroughly.  o I recommended a CD by Dr. Yanique Trujillo, which goes through body scanning to help release tension.  o We discussed following up in two weeks, to see if the CD has been helpful, and also her massage chair for reducing her upper body and neck tension.  We also discussed the possibility of working with a physical therapist or clinical massage therapist, as well.    I provided an after visit summary and handouts of today's therapeutic activities to facilitate practice.    ASSESSMENT/PLAN  PROGRESS TOWARD LONG TERM GOALS:   Adequate progress; too early for objective measures    IMPRESSIONS: R49.0 (Dysphonia) in the context of  Oral-pharyngeal Dysphagia in the context of other noted right sided weakness, which includes soft palate and tongue.  Ms. Natarajan demonstrated a good learning, and reported significantly reduced discomfort after completing the massage techniques learned in therapy " today.    PLAN: I will see Ms. Natarajan in 4 weeks, at which point we will continue therapy.   For practice goals see AVS.     TOTAL SERVICE TIME: 60 minutes  TREATMENT (47037): 60 minutes  NO CHARGE FACILITY FEE (47423)    Audelia Walsh M.M. (voice), MJuniA., CCC/SLP  Speech-Language Pathologist  Certificate of Vocology  [facility]  [phone]  [email or web address]  Prounouns: she/her

## 2019-07-26 NOTE — PROGRESS NOTES
"  [facility] VOICE CLINIC  THERAPY NOTE (CPT 77543)    Patient: Shalini Natarajan  Date of Service: 7/26/2019  Referring physician: Dr. Mujica  Impressions from most recent evaluation (7/1/19):  \"IMPRESSIONS: Shalini Natarajan is a 59 year old female, presenting today with R49.0 (Dysphonia) in the context of  Oral-pharyngeal Dysphagia in the context of other noted right sided weakness, which includes soft palate and tongue, as evidenced by evaluation and the laryngeal exam.  Remarkable findings of the evaluations included reduced mobility of the right vocal fold.    Dysphonia and dysphagia may be partly related to muscle tension compounded by the hyperfunction, poor function and imbalanced function of the intrinsic and extrinsic laryngeal musculature.\"    SUBJECTIVE:  Since her last session, Ms. Natarajan reports the following:     Overall she reports that symptoms have improved somewhat with the therapeutic activities provided.    Successes: able to take more consecutive sips, although still small, but bigger than before. Solid foods have also gotten easier with the strategies learned.    OBJECTIVE:  Ms. Natarajan presents today with the following:  VOICE:  ? Roughness: Mild  ? Breathiness: WNL  ? Strain: WNL  ? Loudness    Conversational speech:  Mild to moderately reduced    Projected speech:  n/a  ? Pitch:    Conversational speech:  Mildly lowered    Pitch glide: neurologically normal      PATIENT REPORTED MEASURES:  Patient Supplied Answers To SLP QOL Questionnaire  Therapy Quality of Life 7/26/2019   Since my l ast session, I used the speech therapy exercises and strategies as recommended by my speech pathologist. Agree strongly   I feel that using my therapy techniques has become a habit Agree   I feel confident in my ability to manage my current and future symptoms. Neither agree nor disagree   Since my last session I feel my symptoms have --------. Improved   Overall, since starting therapy I feel my symptoms are " "--------. Better   Overall, how much better? A little better       THERAPEUTIC ACTIVITIES    Demonstrated previous exercises.  o demonstrated improved technique  o appropriate redirection provided  o instruction provided for increased level of complexity/difficulty    Manual laryngeal massage was performed:    Significant tenderness of the thyrohyoid space with corresponding reduction of space     Thyrohyoid space, greater horns of the hyoid, and base of tongue were targeted with gentle circular massage    Gentle lateralization of the larynx, hyoid tilt, and sternocleidomastoid massage was also performed.    Patient was trained to focus on intentional relaxation of jaw and tongue in addition to area of massage during these maneuvers.    She reports that they are getting a massage chair and she believes that it will be very helpful for reducing her body tension.  Exercises to promote optimal respiratory mechanics    I provided explanation of the anatomy and physiology of respiration for speech and singing; she found this to be helpful    she demonstrated clavicular/neck/shoulder involvement in inhalation    Demonstrated difficulty allowing abdominal relaxation for inhalation    Practiced in a prone and supine position on the massage table, with tactile cue of a hand on the low rib-cage to facilitate awareness of low respiratory engagement.  Progressed to a seated position today.    With clinician support, patient was able to demonstrate improved abdominal relaxation and engagement on inhalation    Optimal exhalation using inward engagement of the abdominal wall with no corresponding collapse of the upper chest cavity was trained using the pulsed \"sh\" task    acceptable improvement in airflow and respiratory mechanics    Semi-Occluded Vocal Tract (SOVT) exercises instructed to reduce laryngeal tension, promote vocal fold pliability, and coordinate respiration and phonation    Straw phonation with water resistance was " "found to be most facilitating     Sustained phonation, and voice vs. voiceless productions used to promote easy voicing and raise awareness of laryngeal tension    Ascending and descending glides utilized to promote vocal fold pliability    \"Messa di voce\", gradual crescendo and decrescendo to vary medial compression was also utilized to promote vocal fold pliability.    Instructed on the benefits of using these exercises for improved coordination of breath flow with phonation and tissue mobilization.    Instructed on the importance of using these exercises as a warm-up / cool down,  and to re-calibrate the voice throughout the day.    Counseling and Education:    Asked many questions about the nature of her symptoms, and I answered all of these thoroughly.  o I recommended a CD by Dr. Yanique Trujillo, which goes through body scanning to help release tension.  o We discussed following up in two weeks, to see if the CD has been helpful, and also her massage chair for reducing her upper body and neck tension.  We also discussed the possibility of working with a physical therapist or clinical massage therapist, as well.    I provided an after visit summary and handouts of today's therapeutic activities to facilitate practice.    ASSESSMENT/PLAN  PROGRESS TOWARD LONG TERM GOALS:   Adequate progress; too early for objective measures    IMPRESSIONS: R49.0 (Dysphonia) in the context of  Oral-pharyngeal Dysphagia in the context of other noted right sided weakness, which includes soft palate and tongue.  Ms. Natarajan demonstrated a good learning, and reported significantly reduced discomfort after completing the massage techniques learned in therapy today.    PLAN: I will see Ms. Natarajan in 4 weeks, at which point we will continue therapy.   For practice goals see AVS.     TOTAL SERVICE TIME: 60 minutes  TREATMENT (84331): 60 minutes  NO CHARGE FACILITY FEE (34311)    Audelia Walsh M.M. (voice), M.A., CCC/SLP  Speech-Language " Pathologist  Certificate of Vocology  [facility]  [phone]  [email or web address]  Prounouns: she/her

## 2019-08-29 ENCOUNTER — TELEPHONE (OUTPATIENT)
Dept: OTOLARYNGOLOGY | Facility: CLINIC | Age: 59
End: 2019-08-29

## 2019-08-29 ENCOUNTER — OFFICE VISIT (OUTPATIENT)
Dept: OTOLARYNGOLOGY | Facility: CLINIC | Age: 59
End: 2019-08-29
Payer: COMMERCIAL

## 2019-08-29 DIAGNOSIS — R49.0 DYSPHONIA: Primary | ICD-10-CM

## 2019-08-29 DIAGNOSIS — R13.12 OROPHARYNGEAL DYSPHAGIA: ICD-10-CM

## 2019-08-29 NOTE — PATIENT INSTRUCTIONS
After Visit Summary    Patient: Shalini Natarajan  Date of Visit: 8/29/2019    Continue to increase level of independence with drinking:   - eliminate the straw and drink from all cups.   - Sipping strategy:     - keep tongue forward and up, touching the hard palate. Helps control swallowing    - alternate liquids and solids to help improve confidence with swallowing different textures.     - Try different temperature extremes.     Oral and Tongue range of motion and strength was found to be essentially within normal limits today!    Check out the CD from Yanique Trujillo, and work with the total body relaxation.     Relieving Extrinsic Muscle Discomfort:    Stretches  o Please follow instructions as learned today and provided on handout  - 7-way neck stretch (2x/day)  - Ribcage stretch (2-3x/day)      Massage (continue)  o Please follow instructions as learned today and provided on handout    Next appt 12/4 at 11 am      Audelia Walsh M.M. (voice), M.A., CCC/SLP  Speech-Language Pathologist  Certificate of Vocology  [facility]  [phone]  [email or web address]  Prounouns: she/her

## 2019-08-29 NOTE — PROGRESS NOTES
"  [facility] VOICE CLINIC  THERAPY NOTE (CPT 37511)    Patient: Shalini Natarajan  Date of Service: 2019  Referring physician: Dr. Mujica  Impressions from most recent evaluation (19):  \"IMPRESSIONS: Shalini Natarajan is a 59 year old female, presenting today with R49.0 (Dysphonia) in the context of  Oral-pharyngeal Dysphagia in the context of other noted right sided weakness, which includes soft palate and tongue, as evidenced by evaluation and the laryngeal exam.  Remarkable findings of the evaluations included reduced mobility of the right vocal fold.    Dysphonia and dysphagia may be partly related to muscle tension compounded by the hyperfunction, poor function and imbalanced function of the intrinsic and extrinsic laryngeal musculature.\"    SUBJECTIVE:  Since her last session, Ms. Natarajan reports the following:     Overall she reports that symptoms are slowly improving; massaging etc.  But her father in law passed away from stage 4 cancer.  So, they have not really been home, as her wife was his POA    She has had fewer choking episodes on foods and liquids.  The first time she choked since our last appointment, was when she returned from the  and was a bit reclined on the couch.     Can drink coffee out of a cup again.  And drinking a small amount of espresso is easy.     Solid foods have also improved, as long as she takes her time.   Did have a difficult time swallowing runny oatmeal and soup, but again she believes this is slowly improving.     Used to be able to chug liquids; now sipping is gradually becoming easier in larger quantities.     Recalled that she was told the right side of her tongue was \"paralyzed\", but believes that her range of motion has improved.     Less slurred speech; \"lizzard\" was difficult word today.    She has not yet had time to work with the CD by Yanique Trujillo, but did bring money and believes that it will be helpful.    OBJECTIVE:  Ms. Natarajan presents today with the " "following:  VOICE:  ? Roughness: Mild  ? Breathiness: WNL  ? Strain: WNL  ? Loudness    Conversational speech:  Mild to moderately reduced    Projected speech:  n/a  ? Pitch:    Conversational speech:  Mildly lowered    Pitch glide: neurologically normal    Swallow:  - Able to safely sip larger quantities with a straw.  - Able to safely sip from a cup without difficulty.    ORAL MOTOR EXAMINATION    Face: Normal    Lip Strength: Intact    Jaw Strength: Intact    Tongue Strength: Intact; much improvement    Teeth:    Upper and lower intact    Alternating motion rates (AMRs): WNL; \"tara\" 2x and additonal trials were WNL    Sequential motion rates (SMRs): WNL    *Overall, Ms. Natarajan tongue does seem to be within normal limits of range of motion and strength; improved.    PATIENT REPORTED MEASURES:  Patient Supplied Answers To SLP QOL Questionnaire  Therapy Quality of Life 8/29/2019   Since my l ast session, I used the speech therapy exercises and strategies as recommended by my speech pathologist. Agree strongly   I feel that using my therapy techniques has become a habit Agree   I feel confident in my ability to manage my current and future symptoms. Agree   Since my last session I feel my symptoms have --------. Improved   Overall, since starting therapy I feel my symptoms are --------. Better   Overall, how much better? Somewhat better     THERAPEUTIC ACTIVITIES    Demonstrated previous exercises.  o demonstrated improved technique  o appropriate redirection provided  o instruction provided for increased level of complexity/difficulty  o Modified stretches and massage techniques; this was helpful.    Completed trials of thin liquids and Melissa [place] cookies today.    Demonstrated a safe and independent swallow during 10/10 trials.     Alternated liquids and solids appropriately with minimal verbal cues.     Counseling and Education:    Asked questions about the nature of her symptoms, and I answered all of these " thoroughly.    A revised regimen for home practice was instructed and an after visit summary was provided.    ASSESSMENT/PLAN  PROGRESS TOWARD LONG TERM GOALS:   Adequate progress; please see above    IMPRESSIONS: R49.0 (Dysphonia) in the context of  Oral-pharyngeal Dysphagia in the context of other noted right sided weakness, which includes soft palate and tongue.  Ms. Natarajan demonstrated a good learning, and reported significantly reduced discomfort after completing the massage techniques learned in therapy today.     PLAN: I will see Ms. Natarajan in 4 weeks, at which point we will continue therapy.   For practice goals see AVS.     PLAN: I will see Ms. Natarajan in December to assess her progress; sooner if she experiences a set-back in her progress.  For practice goals see AVS.     TOTAL SERVICE TIME: 60 minutes  TREATMENT (11760): 60 minutes  NO CHARGE FACILITY FEE (40388)    Audelia Walsh M.M. (voice) MJuniA., CCC/SLP  Speech-Language Pathologist  Certificate of Vocology  [facility]  [phone]  [email or web address]  Prounouns: she/her

## 2019-08-29 NOTE — TELEPHONE ENCOUNTER
LVM with patient letting her know Date/Time of appointment with Audelia Walsh.  Left call center number in case she needs to reschedule.

## 2019-08-29 NOTE — LETTER
"2019       RE: Shalini Natarajan  [address]  [place] [address]     Dear Colleague,    Thank you for referring your patient, Shalini Natarajan, to the [facility] VOICE at [facility]. Please see a copy of my visit note below.      [facility] VOICE CLINIC  THERAPY NOTE (CPT 56742)    Patient: Shalini Natarajan  Date of Service: 2019  Referring physician: Dr. Mujica  Impressions from most recent evaluation (19):  \"IMPRESSIONS: Shalini Natarajan is a 59 year old female, presenting today with R49.0 (Dysphonia) in the context of  Oral-pharyngeal Dysphagia in the context of other noted right sided weakness, which includes soft palate and tongue, as evidenced by evaluation and the laryngeal exam.  Remarkable findings of the evaluations included reduced mobility of the right vocal fold.    Dysphonia and dysphagia may be partly related to muscle tension compounded by the hyperfunction, poor function and imbalanced function of the intrinsic and extrinsic laryngeal musculature.\"    SUBJECTIVE:  Since her last session, Ms. Natarajan reports the following:     Overall she reports that symptoms are slowly improving; massaging etc.  But her father in law passed away from stage 4 cancer.  So, they have not really been home, as her wife was his POA    She has had fewer choking episodes on foods and liquids.  The first time she choked since our last appointment, was when she returned from the  and was a bit reclined on the couch.     Can drink coffee out of a cup again.  And drinking a small amount of espresso is easy.     Solid foods have also improved, as long as she takes her time.   Did have a difficult time swallowing runny oatmeal and soup, but again she believes this is slowly improving.     Used to be able to chug liquids; now sipping is gradually becoming easier in larger quantities.     Recalled that she was told the right side of her tongue was \"paralyzed\", but believes that her " "range of motion has improved.     Less slurred speech; \"lizzard\" was difficult word today.    She has not yet had time to work with the CD by Yanique Trujillo, but did bring money and believes that it will be helpful.    OBJECTIVE:  Ms. Natarajan presents today with the following:  VOICE:  ? Roughness: Mild  ? Breathiness: WNL  ? Strain: WNL  ? Loudness    Conversational speech:  Mild to moderately reduced    Projected speech:  n/a  ? Pitch:    Conversational speech:  Mildly lowered    Pitch glide: neurologically normal    Swallow:  - Able to safely sip larger quantities with a straw.  - Able to safely sip from a cup without difficulty.    ORAL MOTOR EXAMINATION    Face: Normal    Lip Strength: Intact    Jaw Strength: Intact    Tongue Strength: Intact; much improvement    Teeth:    Upper and lower intact    Alternating motion rates (AMRs): WNL; \"tara\" 2x and additonal trials were WNL    Sequential motion rates (SMRs): WNL    *Overall, Ms. Natarajan tongue does seem to be within normal limits of range of motion and strength; improved.    PATIENT REPORTED MEASURES:  Patient Supplied Answers To SLP QOL Questionnaire  Therapy Quality of Life 8/29/2019   Since my l ast session, I used the speech therapy exercises and strategies as recommended by my speech pathologist. Agree strongly   I feel that using my therapy techniques has become a habit Agree   I feel confident in my ability to manage my current and future symptoms. Agree   Since my last session I feel my symptoms have --------. Improved   Overall, since starting therapy I feel my symptoms are --------. Better   Overall, how much better? Somewhat better     THERAPEUTIC ACTIVITIES    Demonstrated previous exercises.  o demonstrated improved technique  o appropriate redirection provided  o instruction provided for increased level of complexity/difficulty  o Modified stretches and massage techniques; this was helpful.    Completed trials of thin liquids and Melissa [place] " cookies today.    Demonstrated a safe and independent swallow during 10/10 trials.     Alternated liquids and solids appropriately with minimal verbal cues.     Counseling and Education:    Asked questions about the nature of her symptoms, and I answered all of these thoroughly.    A revised regimen for home practice was instructed and an after visit summary was provided.    ASSESSMENT/PLAN  PROGRESS TOWARD LONG TERM GOALS:   Adequate progress; please see above    IMPRESSIONS: R49.0 (Dysphonia) in the context of  Oral-pharyngeal Dysphagia in the context of other noted right sided weakness, which includes soft palate and tongue.  Ms. Natarajan demonstrated a good learning, and reported significantly reduced discomfort after completing the massage techniques learned in therapy today.     PLAN: I will see Ms. Natarajan in 4 weeks, at which point we will continue therapy.   For practice goals see AVS.     PLAN: I will see Ms. Natarajan in December to assess her progress; sooner if she experiences a set-back in her progress.  For practice goals see AVS.     TOTAL SERVICE TIME: 60 minutes  TREATMENT (83721): 60 minutes  NO CHARGE FACILITY FEE (20083)    Audelia Walsh M.M. (voice), M.A., CCC/SLP  Speech-Language Pathologist  Certificate of Vocology  [facility]  [phone]  [email or web address]  Prounouns: she/her    Again, thank you for allowing me to participate in the care of your patient.      Sincerely,    Audelia Walsh, SLP

## 2020-03-11 ENCOUNTER — HEALTH MAINTENANCE LETTER (OUTPATIENT)
Age: 60
End: 2020-03-11

## 2021-01-03 ENCOUNTER — HEALTH MAINTENANCE LETTER (OUTPATIENT)
Age: 61
End: 2021-01-03

## 2021-04-25 ENCOUNTER — HEALTH MAINTENANCE LETTER (OUTPATIENT)
Age: 61
End: 2021-04-25

## 2021-10-10 ENCOUNTER — HEALTH MAINTENANCE LETTER (OUTPATIENT)
Age: 61
End: 2021-10-10

## 2022-05-21 ENCOUNTER — HEALTH MAINTENANCE LETTER (OUTPATIENT)
Age: 62
End: 2022-05-21

## 2022-09-18 ENCOUNTER — HEALTH MAINTENANCE LETTER (OUTPATIENT)
Age: 62
End: 2022-09-18

## 2023-05-07 ENCOUNTER — HEALTH MAINTENANCE LETTER (OUTPATIENT)
Age: 63
End: 2023-05-07

## 2023-06-04 ENCOUNTER — HEALTH MAINTENANCE LETTER (OUTPATIENT)
Age: 63
End: 2023-06-04